# Patient Record
Sex: MALE | Race: WHITE | Employment: UNEMPLOYED | URBAN - METROPOLITAN AREA
[De-identification: names, ages, dates, MRNs, and addresses within clinical notes are randomized per-mention and may not be internally consistent; named-entity substitution may affect disease eponyms.]

---

## 2019-07-24 ENCOUNTER — APPOINTMENT (OUTPATIENT)
Dept: GENERAL RADIOLOGY | Facility: HOSPITAL | Age: 29
DRG: 273 | End: 2019-07-24
Attending: EMERGENCY MEDICINE
Payer: MEDICAID

## 2019-07-24 ENCOUNTER — HOSPITAL ENCOUNTER (INPATIENT)
Facility: HOSPITAL | Age: 29
LOS: 9 days | Discharge: HOME HEALTH | DRG: 273 | End: 2019-08-02
Attending: EMERGENCY MEDICINE | Admitting: HOSPITALIST
Payer: MEDICAID

## 2019-07-24 DIAGNOSIS — N17.9 AKI (ACUTE KIDNEY INJURY) (HCC): ICD-10-CM

## 2019-07-24 DIAGNOSIS — J06.9 UPPER RESPIRATORY TRACT INFECTION, UNSPECIFIED TYPE: ICD-10-CM

## 2019-07-24 DIAGNOSIS — B34.8 PARAINFLUENZA: ICD-10-CM

## 2019-07-24 DIAGNOSIS — R79.89 ELEVATED LFTS: ICD-10-CM

## 2019-07-24 DIAGNOSIS — R77.8 ELEVATED TROPONIN: ICD-10-CM

## 2019-07-24 DIAGNOSIS — I42.9 CARDIOMYOPATHY, UNSPECIFIED TYPE (HCC): Primary | ICD-10-CM

## 2019-07-24 DIAGNOSIS — R73.9 HYPERGLYCEMIA: ICD-10-CM

## 2019-07-24 DIAGNOSIS — I42.8 OTHER CARDIOMYOPATHY (HCC): ICD-10-CM

## 2019-07-24 DIAGNOSIS — G47.33 OSA (OBSTRUCTIVE SLEEP APNEA): ICD-10-CM

## 2019-07-24 DIAGNOSIS — K56.7 ILEUS (HCC): ICD-10-CM

## 2019-07-24 DIAGNOSIS — I48.91 ATRIAL FIBRILLATION WITH RAPID VENTRICULAR RESPONSE (HCC): ICD-10-CM

## 2019-07-24 LAB
ADENOVIRUS PCR:: NEGATIVE
ALBUMIN SERPL-MCNC: 4.3 G/DL (ref 3.4–5)
ALBUMIN/GLOB SERPL: 1.1 {RATIO} (ref 1–2)
ALLENS TEST: POSITIVE
ALP LIVER SERPL-CCNC: 42 U/L (ref 45–117)
ALT SERPL-CCNC: 32 U/L (ref 16–61)
ANION GAP SERPL CALC-SCNC: 10 MMOL/L (ref 0–18)
ANION GAP SERPL CALC-SCNC: 8 MMOL/L (ref 0–18)
ARTERIAL BLD GAS O2 SATURATION: 95 % (ref 92–100)
ARTERIAL BLOOD GAS BASE EXCESS: 1.2 MMOL/L (ref ?–2)
ARTERIAL BLOOD GAS HCO3: 25.5 MEQ/L (ref 22–26)
ARTERIAL BLOOD GAS PCO2: 40 MM HG (ref 35–45)
ARTERIAL BLOOD GAS PH: 7.43 (ref 7.35–7.45)
ARTERIAL BLOOD GAS PO2: 76 MM HG (ref 80–105)
AST SERPL-CCNC: 35 U/L (ref 15–37)
ATRIAL RATE: 75 BPM
B PERT DNA SPEC QL NAA+PROBE: NEGATIVE
BASOPHILS # BLD AUTO: 0.04 X10(3) UL (ref 0–0.2)
BASOPHILS NFR BLD AUTO: 0.4 %
BILIRUB SERPL-MCNC: 0.5 MG/DL (ref 0.1–2)
BUN BLD-MCNC: 13 MG/DL (ref 7–18)
BUN BLD-MCNC: 13 MG/DL (ref 7–18)
BUN/CREAT SERPL: 12 (ref 10–20)
BUN/CREAT SERPL: 14.9 (ref 10–20)
C PNEUM DNA SPEC QL NAA+PROBE: NEGATIVE
CALCIUM BLD-MCNC: 9 MG/DL (ref 8.5–10.1)
CALCIUM BLD-MCNC: 9 MG/DL (ref 8.5–10.1)
CALCULATED O2 SATURATION: 96 % (ref 92–100)
CARBOXYHEMOGLOBIN: 1 % SAT (ref 0–3)
CHLORIDE SERPL-SCNC: 103 MMOL/L (ref 98–112)
CHLORIDE SERPL-SCNC: 106 MMOL/L (ref 98–112)
CO2 SERPL-SCNC: 22 MMOL/L (ref 21–32)
CO2 SERPL-SCNC: 25 MMOL/L (ref 21–32)
CORONAVIRUS 229E PCR:: NEGATIVE
CORONAVIRUS HKU1 PCR:: NEGATIVE
CORONAVIRUS NL63 PCR:: NEGATIVE
CORONAVIRUS OC43 PCR:: NEGATIVE
CREAT BLD-MCNC: 0.87 MG/DL (ref 0.7–1.3)
CREAT BLD-MCNC: 1.08 MG/DL (ref 0.7–1.3)
DEPRECATED RDW RBC AUTO: 39 FL (ref 35.1–46.3)
DIGOXIN SERPL-MCNC: 0.55 NG/ML (ref 0.8–2)
EOSINOPHIL # BLD AUTO: 0.01 X10(3) UL (ref 0–0.7)
EOSINOPHIL NFR BLD AUTO: 0.1 %
ERYTHROCYTE [DISTWIDTH] IN BLOOD BY AUTOMATED COUNT: 11.9 % (ref 11–15)
FLUAV RNA SPEC QL NAA+PROBE: NEGATIVE
FLUBV RNA SPEC QL NAA+PROBE: NEGATIVE
GLOBULIN PLAS-MCNC: 3.9 G/DL (ref 2.8–4.4)
GLUCOSE BLD-MCNC: 122 MG/DL (ref 70–99)
GLUCOSE BLD-MCNC: 162 MG/DL (ref 70–99)
HCT VFR BLD AUTO: 46.2 % (ref 39–53)
HGB BLD-MCNC: 15.7 G/DL (ref 13–17.5)
IMM GRANULOCYTES # BLD AUTO: 0.05 X10(3) UL (ref 0–1)
IMM GRANULOCYTES NFR BLD: 0.5 %
L/M: 1 L/MIN
LACTATE SERPL-SCNC: 2.2 MMOL/L (ref 0.4–2)
LACTATE SERPL-SCNC: 2.2 MMOL/L (ref 0.4–2)
LACTATE SERPL-SCNC: 2.4 MMOL/L (ref 0.4–2)
LYMPHOCYTES # BLD AUTO: 1.19 X10(3) UL (ref 1–4)
LYMPHOCYTES NFR BLD AUTO: 11.3 %
M PROTEIN MFR SERPL ELPH: 8.2 G/DL (ref 6.4–8.2)
MCH RBC QN AUTO: 30.4 PG (ref 26–34)
MCHC RBC AUTO-ENTMCNC: 34 G/DL (ref 31–37)
MCV RBC AUTO: 89.4 FL (ref 80–100)
METAPNEUMOVIRUS PCR:: NEGATIVE
METHEMOGLOBIN: 0.5 % SAT (ref 0.4–1.5)
MONOCYTES # BLD AUTO: 1.52 X10(3) UL (ref 0.1–1)
MONOCYTES NFR BLD AUTO: 14.4 %
MYCOPLASMA PNEUMONIA PCR:: NEGATIVE
NEUTROPHILS # BLD AUTO: 7.73 X10 (3) UL (ref 1.5–7.7)
NEUTROPHILS # BLD AUTO: 7.73 X10(3) UL (ref 1.5–7.7)
NEUTROPHILS NFR BLD AUTO: 73.3 %
OSMOLALITY SERPL CALC.SUM OF ELEC: 286 MOSM/KG (ref 275–295)
OSMOLALITY SERPL CALC.SUM OF ELEC: 287 MOSM/KG (ref 275–295)
P/F RATIO: 361.3 MMHG
PARAINFLUENZA 1 PCR:: NEGATIVE
PARAINFLUENZA 2 PCR:: NEGATIVE
PARAINFLUENZA 3 PCR:: POSITIVE
PARAINFLUENZA 4 PCR:: NEGATIVE
PATIENT TEMPERATURE: 98.6 F
PLATELET # BLD AUTO: 240 10(3)UL (ref 150–450)
POTASSIUM SERPL-SCNC: 4.1 MMOL/L (ref 3.5–5.1)
POTASSIUM SERPL-SCNC: 4.5 MMOL/L (ref 3.5–5.1)
PROCALCITONIN SERPL-MCNC: 0.07 NG/ML
Q-T INTERVAL: 290 MS
QRS DURATION: 72 MS
QTC CALCULATION (BEZET): 446 MS
R AXIS: 182 DEGREES
RBC # BLD AUTO: 5.17 X10(6)UL (ref 4.3–5.7)
RHINOVIRUS/ENTERO PCR:: NEGATIVE
RSV RNA SPEC QL NAA+PROBE: NEGATIVE
SODIUM SERPL-SCNC: 136 MMOL/L (ref 136–145)
SODIUM SERPL-SCNC: 138 MMOL/L (ref 136–145)
T AXIS: -28 DEGREES
TOTAL HEMOGLOBIN: 15.1 G/DL (ref 13.2–17.3)
TROPONIN I SERPL-MCNC: 1.56 NG/ML (ref ?–0.04)
TROPONIN I SERPL-MCNC: 1.74 NG/ML (ref ?–0.04)
VENTRICULAR RATE: 142 BPM
WBC # BLD AUTO: 10.5 X10(3) UL (ref 4–11)

## 2019-07-24 PROCEDURE — 99223 1ST HOSP IP/OBS HIGH 75: CPT | Performed by: HOSPITALIST

## 2019-07-24 PROCEDURE — 71045 X-RAY EXAM CHEST 1 VIEW: CPT | Performed by: EMERGENCY MEDICINE

## 2019-07-24 RX ORDER — IPRATROPIUM BROMIDE AND ALBUTEROL SULFATE 2.5; .5 MG/3ML; MG/3ML
3 SOLUTION RESPIRATORY (INHALATION)
Status: DISCONTINUED | OUTPATIENT
Start: 2019-07-24 | End: 2019-07-25

## 2019-07-24 RX ORDER — METOPROLOL SUCCINATE 25 MG/1
75 TABLET, EXTENDED RELEASE ORAL DAILY
Status: DISCONTINUED | OUTPATIENT
Start: 2019-07-24 | End: 2019-07-28

## 2019-07-24 RX ORDER — METOPROLOL SUCCINATE 25 MG/1
25 TABLET, EXTENDED RELEASE ORAL DAILY
Refills: 2 | Status: ON HOLD | COMMUNITY
Start: 2019-06-29 | End: 2019-08-02

## 2019-07-24 RX ORDER — THIAMINE MONONITRATE (VIT B1) 100 MG
100 TABLET ORAL DAILY
Status: ON HOLD | COMMUNITY
End: 2019-12-16

## 2019-07-24 RX ORDER — BACLOFEN 10 MG/1
20 TABLET ORAL NIGHTLY
Refills: 2 | COMMUNITY
Start: 2019-06-28

## 2019-07-24 RX ORDER — DIGOXIN 0.25 MG/ML
500 INJECTION INTRAMUSCULAR; INTRAVENOUS ONCE
Status: COMPLETED | OUTPATIENT
Start: 2019-07-24 | End: 2019-07-24

## 2019-07-24 RX ORDER — DIGOXIN 125 MCG
250 TABLET ORAL DAILY
Refills: 0 | Status: ON HOLD | COMMUNITY
Start: 2019-07-18 | End: 2020-03-12

## 2019-07-24 RX ORDER — LOSARTAN POTASSIUM 25 MG/1
25 TABLET ORAL DAILY
Status: DISCONTINUED | OUTPATIENT
Start: 2019-07-24 | End: 2019-07-30

## 2019-07-24 RX ORDER — LOSARTAN POTASSIUM 25 MG/1
25 TABLET ORAL DAILY
Refills: 10 | Status: ON HOLD | COMMUNITY
End: 2021-12-22

## 2019-07-24 RX ORDER — METOPROLOL SUCCINATE 50 MG/1
100 TABLET, EXTENDED RELEASE ORAL DAILY
Refills: 0 | COMMUNITY
End: 2021-12-23

## 2019-07-24 RX ORDER — GUAIFENESIN 600 MG
600 TABLET, EXTENDED RELEASE 12 HR ORAL 2 TIMES DAILY
Status: DISCONTINUED | OUTPATIENT
Start: 2019-07-24 | End: 2019-08-01

## 2019-07-24 RX ORDER — BACLOFEN 20 MG/1
20 TABLET ORAL NIGHTLY
Status: DISCONTINUED | OUTPATIENT
Start: 2019-07-24 | End: 2019-08-02

## 2019-07-24 RX ORDER — SPIRONOLACTONE 25 MG/1
12.5 TABLET ORAL DAILY
Status: DISCONTINUED | OUTPATIENT
Start: 2019-07-24 | End: 2019-08-02

## 2019-07-24 RX ORDER — DIGOXIN 125 MCG
0.12 TABLET ORAL DAILY
Status: DISCONTINUED | OUTPATIENT
Start: 2019-07-24 | End: 2019-08-02

## 2019-07-24 RX ORDER — SPIRONOLACTONE 25 MG/1
12.5 TABLET ORAL DAILY
Refills: 1 | COMMUNITY
Start: 2019-07-22

## 2019-07-24 RX ORDER — OMEPRAZOLE 20 MG/1
20 CAPSULE, DELAYED RELEASE ORAL
Status: ON HOLD | COMMUNITY
End: 2019-12-16

## 2019-07-24 RX ORDER — METOPROLOL SUCCINATE 50 MG/1
50 TABLET, EXTENDED RELEASE ORAL DAILY
Status: DISCONTINUED | OUTPATIENT
Start: 2019-07-24 | End: 2019-07-24

## 2019-07-24 RX ORDER — CYANOCOBALAMIN (VITAMIN B-12) 500 MCG
400 LOZENGE ORAL EVERY MORNING
Status: ON HOLD | COMMUNITY
End: 2019-12-16

## 2019-07-24 RX ORDER — SODIUM CHLORIDE 9 MG/ML
INJECTION, SOLUTION INTRAVENOUS CONTINUOUS
Status: ACTIVE | OUTPATIENT
Start: 2019-07-24 | End: 2019-07-24

## 2019-07-24 NOTE — CONSULTS
[facility]  Report of Consultation    Sonny Coello Patient Status:  Emergency    5/3/1990 MRN [id number]   Location [address] [facility] Attending Thierry Jimenez MD   Hosp Day # 0 PCP Angelic Llanos MD     Reason for Consult kg)      Telemetry: reviewed and in atrial fibrillation  General: Awake, alert and in no apparent distress. HEENT: MMM, oropharynx clear  Neck: No JVD  Cardiac: Tachy, irregular, S1, S2 normal  Lungs: Clear, no wheezing  Abdomen: Soft, non-tender.    Extre

## 2019-07-24 NOTE — H&P
[facility]  History and Physical     Latisha Navarrete Patient Status:  Inpatient    5/3/1990 MRN [id number]   [facility] 6NE-A Attending Gogo Blount MD   Hosp Day # 0 PCP Catalina Cadet MD     Chief Complaint: weakness    His Prior to Encounter:  Losartan Potassium 25 MG Oral Tab Take 25 mg by mouth daily. Disp:  Rfl: 10   Metoprolol Succinate ER 25 MG Oral Tablet 24 Hr Take 25 mg by mouth daily.  Disp:  Rfl: 2   Metoprolol Succinate ER 50 MG Oral Tablet 24 Hr Take 50 mg by mout Recent Labs   Lab 07/24/19  0825   *   BUN 13   CREATSERUM 1.08   GFRAA 107   GFRNAA 93   CA 9.0   ALB 4.3      K 4.5      CO2 25.0   ALKPHO 42*   AST 35   ALT 32   BILT 0.5   TP 8.2     No results for input(s): PTP, INR in the last

## 2019-07-24 NOTE — ED NOTES
Upon Rn assessment patients lips pale in color, rt jugular pulsating. Pt placed on cardiac monitor and pulse ox. Rn continuing to monitor pt. Pt Afib on the monitor. Dr. Shey Kapadia notified of vss.

## 2019-07-24 NOTE — ED INITIAL ASSESSMENT (HPI)
Pt aox4. Pt presents to ed from home accompanied by parents. Pt is wheelchair bound. Pt to ed for tachycardia, diaphoresis, fever x 2 days.

## 2019-07-24 NOTE — PLAN OF CARE
Problem: SAFETY ADULT - FALL  Goal: Free from fall injury  Description  INTERVENTIONS:  - Assess pt frequently for physical needs  - Identify cognitive and physical deficits and behaviors that affect risk of falls.   - Oakes fall precautions as indica

## 2019-07-24 NOTE — HISTORICAL OFFICE NOTE
Bernard Benavides  : 1990  ACCOUNT: [id number]  [phone]  PCP: Dr. Anjel Ahmadi    TODAY'S DATE: 2018  DICTATED BY: [Dr. Mary Au    CHIEF COMPLAINT: [Followup of .  Heart failure, systolic and Followup of Cardiomyopathy secondary to Never used tobacco. denies smoking. CAFFEINE: no caffeine. ALCOHOL: denies drinking. EXERCISE: minimal. DIET: no special diet. MARITAL STATUS: single. OCCUPATION: disabled.      ALLERGIES: No Known Allergies    MEDICATIONS: Selected prescriptions see below recommended that she sees the pulmonologist as soon as possible to reinstitute home oxygen and determine if Sandeep needs a new CPAP mask. I told her that dose labs are stable and he does not have any sign of an ongoing respiratory tract infection.  I have aske

## 2019-07-24 NOTE — CONSULTS
[facility]  Report of Consultation    Christiano Cam Patient Status:  Inpatient    5/3/1990 MRN [id number]   [facility] 6NE-A Attending Hesham Marshall MD   Hosp Day # 0 PCP Lito Joshi MD     Reason for Consultation:  Laith Rao Surgical History:   Procedure Laterality Date   • SPINAL FUSION       No family history on file. reports that he has never smoked.  He has never used smokeless tobacco.    Allergies:  No Known Allergies    Medications:      No current outpatient medicatio PBWGM@    Lab Data Review:  Recent Labs     07/24/19  0825   WBC 10.5   HGB 15.7   .0     Recent Labs     07/24/19  0825      K 4.5      CO2 25.0   BUN 13   CREATSERUM 1.08   CA 9.0   ALB 4.3       @MG@    No results found for: PT, INR Yaron  7/24/2019  12:43 PM

## 2019-07-24 NOTE — CONSULTS
INFECTIOUS DISEASE CONSULTATION    Rancho Ma Patient Status:  Inpatient    5/3/1990 MRN [id number]   [facility] 6NE-A Attending Lon Estes MD   Hosp Day # 0 PCP Sonia Huff 0.125 mg by mouth daily. Disp:  Rfl: 0   spironolactone 25 MG Oral Tab Take 12.5 mg by mouth daily. Disp:  Rfl: 1   baclofen 10 MG Oral Tab Take 20 mg by mouth nightly. Disp:  Rfl: 2   Vitamin E 400 units Oral Tab Take 400 Units by mouth every morning. Updated: 07/24/19 1341    Procalcitonin 0.07 ng/mL          Microbiologic Data:   No results found for this visit on 07/24/19.         Problem list reviewed:  Patient Active Problem List:     Hyperglycemia     Atrial fibrillation with rapid ventricular resp

## 2019-07-24 NOTE — ED PROVIDER NOTES
Patient Seen in: [facility] Emergency Department    History   Patient presents with:  Arrythmia/Palpitations (cardiovascular)    Stated Complaint:     HPI    This is a 67-year-old male complaining of rapid heart rate.   This patient has Isabel at above.    Physical Exam     ED Triage Vitals   BP 07/24/19 0830 (!) 88/61   Pulse 07/24/19 0830 (!) 168   Resp 07/24/19 0830 (!) 0   Temp 07/24/19 0835 98.4 °F (36.9 °C)   Temp src 07/24/19 0835 Temporal   SpO2 07/24/19 0830 96 %   O2 Device 07/24/19 0830 Positive (*)     All other components within normal limits   CBC W/ DIFFERENTIAL - Abnormal; Notable for the following components:    Neutrophil Absolute Prelim 7.73 (*)     Neutrophil Absolute 7.73 (*)     Monocyte Absolute 1.52 (*)     All other componen seen by cardiology emergency department will be admitted to CCU    Admission disposition: 7/24/2019  9:39 AM                 Disposition and Plan     Clinical Impression:  Atrial fibrillation with rapid ventricular response ([address]  (primary encounter diagno

## 2019-07-25 ENCOUNTER — APPOINTMENT (OUTPATIENT)
Dept: GENERAL RADIOLOGY | Facility: HOSPITAL | Age: 29
DRG: 273 | End: 2019-07-25
Attending: INTERNAL MEDICINE
Payer: MEDICAID

## 2019-07-25 LAB
ANION GAP SERPL CALC-SCNC: 15 MMOL/L (ref 0–18)
ANION GAP SERPL CALC-SCNC: 17 MMOL/L (ref 0–18)
APTT PPP: 29.8 SECONDS (ref 25.4–36.1)
APTT PPP: 92.4 SECONDS (ref 25.4–36.1)
ATRIAL RATE: 312 BPM
ATRIAL RATE: 342 BPM
ATRIAL RATE: 342 BPM
BILIRUB UR QL STRIP.AUTO: NEGATIVE
BUN BLD-MCNC: 19 MG/DL (ref 7–18)
BUN BLD-MCNC: 21 MG/DL (ref 7–18)
BUN/CREAT SERPL: 11.2 (ref 10–20)
BUN/CREAT SERPL: 11.4 (ref 10–20)
CALCIUM BLD-MCNC: 8.2 MG/DL (ref 8.5–10.1)
CALCIUM BLD-MCNC: 8.5 MG/DL (ref 8.5–10.1)
CHLORIDE SERPL-SCNC: 100 MMOL/L (ref 98–112)
CHLORIDE SERPL-SCNC: 99 MMOL/L (ref 98–112)
CO2 SERPL-SCNC: 19 MMOL/L (ref 21–32)
CO2 SERPL-SCNC: 21 MMOL/L (ref 21–32)
COLOR UR AUTO: YELLOW
CREAT BLD-MCNC: 1.7 MG/DL (ref 0.7–1.3)
CREAT BLD-MCNC: 1.85 MG/DL (ref 0.7–1.3)
DEPRECATED RDW RBC AUTO: 41.1 FL (ref 35.1–46.3)
ERYTHROCYTE [DISTWIDTH] IN BLOOD BY AUTOMATED COUNT: 12.2 % (ref 11–15)
GLUCOSE BLD-MCNC: 273 MG/DL (ref 70–99)
GLUCOSE BLD-MCNC: 369 MG/DL (ref 70–99)
GLUCOSE UR STRIP.AUTO-MCNC: >=500 MG/DL
HCT VFR BLD AUTO: 48.1 % (ref 39–53)
HGB BLD-MCNC: 15.9 G/DL (ref 13–17.5)
HYALINE CASTS #/AREA URNS AUTO: PRESENT /LPF
KETONES UR STRIP.AUTO-MCNC: 20 MG/DL
LEUKOCYTE ESTERASE UR QL STRIP.AUTO: NEGATIVE
MCH RBC QN AUTO: 30.5 PG (ref 26–34)
MCHC RBC AUTO-ENTMCNC: 33.1 G/DL (ref 31–37)
MCV RBC AUTO: 92.1 FL (ref 80–100)
NITRITE UR QL STRIP.AUTO: NEGATIVE
OSMOLALITY SERPL CALC.SUM OF ELEC: 295 MOSM/KG (ref 275–295)
OSMOLALITY SERPL CALC.SUM OF ELEC: 297 MOSM/KG (ref 275–295)
P AXIS: 122 DEGREES
P AXIS: 174 DEGREES
PH UR STRIP.AUTO: 5 [PH] (ref 4.5–8)
PLATELET # BLD AUTO: 259 10(3)UL (ref 150–450)
POTASSIUM SERPL-SCNC: 4 MMOL/L (ref 3.5–5.1)
POTASSIUM SERPL-SCNC: 4.7 MMOL/L (ref 3.5–5.1)
PROT UR STRIP.AUTO-MCNC: 30 MG/DL
Q-T INTERVAL: 224 MS
Q-T INTERVAL: 228 MS
Q-T INTERVAL: 278 MS
QRS DURATION: 72 MS
QRS DURATION: 76 MS
QRS DURATION: 76 MS
QTC CALCULATION (BEZET): 374 MS
QTC CALCULATION (BEZET): 377 MS
QTC CALCULATION (BEZET): 384 MS
R AXIS: 180 DEGREES
R AXIS: 192 DEGREES
R AXIS: 201 DEGREES
RBC # BLD AUTO: 5.22 X10(6)UL (ref 4.3–5.7)
RBC UR QL AUTO: NEGATIVE
SODIUM SERPL-SCNC: 135 MMOL/L (ref 136–145)
SODIUM SERPL-SCNC: 136 MMOL/L (ref 136–145)
SP GR UR STRIP.AUTO: 1.03 (ref 1–1.03)
T AXIS: -14 DEGREES
T AXIS: -6 DEGREES
T AXIS: 45 DEGREES
UROBILINOGEN UR STRIP.AUTO-MCNC: <2 MG/DL
VENTRICULAR RATE: 109 BPM
VENTRICULAR RATE: 171 BPM
VENTRICULAR RATE: 171 BPM
WBC # BLD AUTO: 15.4 X10(3) UL (ref 4–11)

## 2019-07-25 PROCEDURE — 05H633Z INSERTION OF INFUSION DEVICE INTO LEFT SUBCLAVIAN VEIN, PERCUTANEOUS APPROACH: ICD-10-PCS | Performed by: HOSPITALIST

## 2019-07-25 PROCEDURE — 99232 SBSQ HOSP IP/OBS MODERATE 35: CPT | Performed by: HOSPITALIST

## 2019-07-25 PROCEDURE — 71045 X-RAY EXAM CHEST 1 VIEW: CPT | Performed by: INTERNAL MEDICINE

## 2019-07-25 RX ORDER — HEPARIN SODIUM AND DEXTROSE 10000; 5 [USP'U]/100ML; G/100ML
INJECTION INTRAVENOUS CONTINUOUS
Status: DISCONTINUED | OUTPATIENT
Start: 2019-07-25 | End: 2019-07-29

## 2019-07-25 RX ORDER — HEPARIN SODIUM AND DEXTROSE 10000; 5 [USP'U]/100ML; G/100ML
12 INJECTION INTRAVENOUS ONCE
Status: DISCONTINUED | OUTPATIENT
Start: 2019-07-25 | End: 2019-07-29

## 2019-07-25 RX ORDER — HEPARIN SODIUM 5000 [USP'U]/ML
60 INJECTION INTRAVENOUS; SUBCUTANEOUS ONCE
Status: COMPLETED | OUTPATIENT
Start: 2019-07-25 | End: 2019-07-25

## 2019-07-25 RX ORDER — ONDANSETRON 2 MG/ML
INJECTION INTRAMUSCULAR; INTRAVENOUS
Status: COMPLETED
Start: 2019-07-25 | End: 2019-07-25

## 2019-07-25 RX ORDER — ACETAMINOPHEN 325 MG/1
650 TABLET ORAL EVERY 6 HOURS PRN
Status: DISCONTINUED | OUTPATIENT
Start: 2019-07-25 | End: 2019-08-02

## 2019-07-25 RX ORDER — ONDANSETRON 2 MG/ML
4 INJECTION INTRAMUSCULAR; INTRAVENOUS ONCE
Status: COMPLETED | OUTPATIENT
Start: 2019-07-25 | End: 2019-07-25

## 2019-07-25 RX ORDER — DIGOXIN 0.25 MG/ML
250 INJECTION INTRAMUSCULAR; INTRAVENOUS ONCE
Status: COMPLETED | OUTPATIENT
Start: 2019-07-25 | End: 2019-07-25

## 2019-07-25 RX ORDER — ONDANSETRON 2 MG/ML
4 INJECTION INTRAMUSCULAR; INTRAVENOUS EVERY 4 HOURS PRN
Status: DISCONTINUED | OUTPATIENT
Start: 2019-07-25 | End: 2019-08-02

## 2019-07-25 RX ORDER — SODIUM CHLORIDE 9 MG/ML
INJECTION, SOLUTION INTRAVENOUS CONTINUOUS
Status: DISCONTINUED | OUTPATIENT
Start: 2019-07-25 | End: 2019-07-28

## 2019-07-25 RX ORDER — ONDANSETRON 2 MG/ML
4 INJECTION INTRAMUSCULAR; INTRAVENOUS EVERY 6 HOURS PRN
Status: DISCONTINUED | OUTPATIENT
Start: 2019-07-25 | End: 2019-07-25

## 2019-07-25 RX ORDER — IPRATROPIUM BROMIDE AND ALBUTEROL SULFATE 2.5; .5 MG/3ML; MG/3ML
3 SOLUTION RESPIRATORY (INHALATION) EVERY 4 HOURS PRN
Status: DISCONTINUED | OUTPATIENT
Start: 2019-07-25 | End: 2019-08-02

## 2019-07-25 RX ORDER — SODIUM CHLORIDE 0.9 % (FLUSH) 0.9 %
10 SYRINGE (ML) INJECTION EVERY 12 HOURS
Status: DISCONTINUED | OUTPATIENT
Start: 2019-07-25 | End: 2019-08-02

## 2019-07-25 RX ORDER — DILTIAZEM HYDROCHLORIDE 5 MG/ML
10 INJECTION INTRAVENOUS ONCE
Status: COMPLETED | OUTPATIENT
Start: 2019-07-25 | End: 2019-07-25

## 2019-07-25 NOTE — RESPIRATORY THERAPY NOTE
Received patient on 1L O2. Duoneb given QID, CPT ordered for QID but held last night because mom was concerned about patient's HR and a-fib. Breath sounds are clear/diminished, patient has strong cough.  AVAPS was attempted last night R10/450/10-20/+6, with

## 2019-07-25 NOTE — OCCUPATIONAL THERAPY NOTE
OCCUPATIONAL THERAPY QUICK EVALUATION - INPATIENT    Room Number: 0196/2319-T  Evaluation Date: 7/25/2019     Type of Evaluation: Initial  Presenting Problem: weakness    Physician Order: IP Consult to Occupational Therapy  Reason for Therapy:  ADL/IADL Dy NEUROLOGICAL FINDINGS                   ACTIVITY TOLERANCE                         O2 SATURATIONS                ACTIVITIES OF DAILY LIVING ASSESSMENT  AM-PAC ‘6-Clicks’ Inpatient Daily Activity Short Form   How much help from another person does the p Complexity  Occupational Profile/Medical History  LOW - Brief history including review of medical or therapy records    Specific performance deficits impacting engagement in ADL/IADL  LOW  1 - 3 performance deficits    Client Assessment/Performance Deficit

## 2019-07-25 NOTE — PROGRESS NOTES
[facility]  Advanced Heart Failure Progress Note    David Lal Patient Status:  Inpatient    5/3/1990 MRN [id number]   [facility] 6NE-A Attending Estuardo Carter MD   Hosp Day # 1 PCP Carlos Sanders MD     Subjective:  Very le 650 mg 650 mg Oral Q6H PRN   ipratropium-albuterol (DUONEB) nebulizer solution 3 mL 3 mL Nebulization QID   guaiFENesin ER (MUCINEX) 12 hr tab 600 mg 600 mg Oral BID   guaiFENesin (ROBITUSSIN) 100mg/5ml LIQUID 100 mg 100 mg Oral Q4H PRN   baclofen (LIORESA

## 2019-07-25 NOTE — PHYSICAL THERAPY NOTE
Physical Therapy    Orders received for PT evaluation. Pt admitted w/ a fib w/ rvr and upper respiratory infection. 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 Pt's family uses a shahram lift for all transfers.   Pt does not require

## 2019-07-25 NOTE — PHYSICAL THERAPY NOTE
Physical Therapy    Order received for PT. Pt's heart rate currently running in the 150's and low blood pressure. Will re-attempt as schedule and medical stability allow.

## 2019-07-25 NOTE — RESPIRATORY THERAPY NOTE
Assessed patient to adjust settings for comfort after it was reported this morning he was not tolerating last night and refused. Upon arrival to room, patient was already on Trilogy due to increasing sleepiness.  While monitoring patient was noted to have

## 2019-07-25 NOTE — PLAN OF CARE
Assumed care of this patient at [address] [place] [address]. A&O to person, follows commands. Monitor initially showing a-fib/flutter in the 110-130's, on Cardizem at 5 mg/hr. Then HR noted to be frequently in the 160's, MD notified.   Titrated up Cardizem gtt as ordered unsuc

## 2019-07-25 NOTE — PROGRESS NOTES
[facility]  Progress Note     Essence Herminia Patient Status:  Inpatient    5/3/1990 MRN [id number]   [facility] 6NE-A Attending Lilo Coy MD   Hosp Day # 1 PCP Zara Sue MD     Chief Complaint: Afib   S:  Vomited x above. Low suspicion for sepsis   5. Possible URI- resp panel + parainfluenza virus  1. ID and Pulmonary consulted by the Cardiologist   6. 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 Per Cardiology   7. Friedreich's Ataxia- outpt f/u   8.  Slee

## 2019-07-25 NOTE — PAYOR COMM NOTE
--------------  ADMISSION REVIEW     Payor: Mark Mar #:  [id number]  Authorization Number: [id number]    Admit date: 7/24/19  Admit time: 5       Admitting Physician: Ho Malone DO  Attending • Congestive heart disease (HCC)    • Depression    • Friedreich ataxia (HCC)    • Hearing impairment    • Incontinence    • Muscle weakness    • Problems with swallowing    • Scoliosis    • Sleep apnea    • Visual impairment        Past Surgical History: All other components within normal limits   TROPONIN I - Abnormal; Notable for the following components:    Troponin 1.740 (*)     All other components within normal limits   LACTIC ACID, PLASMA - Abnormal; Notable for the following components:    Lactic Xr Chest Ap Portable  (cpt=71045)    Result Date: 7/24/2019  CONCLUSION:  Enlarged cardiac silhouette with minimal pulmonary vascular congestion.     Dictated by: Patrica Ball MD on 7/24/2019 at 9:15     Approved by: Patrica Ball MD  5/3/1990 MRN [id number]   [facility] 6NE-A Attending Charlie Lr MD   Hosp Day # 0 PCP Cici Srinivasan MD     Chief Complaint: weakness    History of Present Illness: Lorice Bamberger is a 30year old male presents with generalized wea Losartan Potassium 25 MG Oral Tab Take 25 mg by mouth daily. Disp:  Rfl: 10   Metoprolol Succinate ER 25 MG Oral Tablet 24 Hr Take 25 mg by mouth daily. Disp:  Rfl: 2   Metoprolol Succinate ER 50 MG Oral Tablet 24 Hr Take 50 mg by mouth daily.  To take gretta Lab 07/24/19  0825   *   BUN 13   CREATSERUM 1.08   GFRAA 107   GFRNAA 93   CA 9.0   ALB 4.3      K 4.5      CO2 25.0   ALKPHO 42*   AST 35   ALT 32   BILT 0.5   TP 8.2     No results for input(s): PTP, INR in the last 168 hours.   Recent Piter Pulido is a pleasant 30year old gentleman, a patient of my partner Dr. Mireille Diehl. He has a history of Friedreich's ataxia with cardiomyopathy and ejection fraction 25%.   His mom and dad have been sick with a viral illness and she thinks that he m Musculoskeletal: Decreased range of motion consistent with diagnosis of matrix ataxia  Neurologic: Normal affect, alert and in no apparent distress  Skin: Warm and dry.      Laboratories and Data:     Labs:         Lab Results   Component Value Date     WB INFECTIOUS DISEASE CONSULTATION           Paz Kay Patient Status:  Inpatient    5/3/1990 MRN [id number]   [facility] 6NE-A Attending Metoprolol Succinate ER 50 MG Oral Tablet 24 Hr Take 50 mg by mouth daily. To take along with the 25 mg for total dose of 75 mg Disp:  Rfl: 0   digoxin 0.125 MG Oral Tab Take 0.125 mg by mouth daily.  Disp:  Rfl: 0   spironolactone 25 MG Oral Tab Take 12.5 ALKPHO 42*   AST 35   ALT 32   BILT 0.5   TP 8.2                  Lab Results   Component Value Date     TROP 1.740 07/24/2019             Procalcitonin [[id number]] (Normal) Collected: 07/24/19 1117   Specimen: Blood Updated: 07/24/19 1341     Procalcitoni 3.  Would also start IV heparin particularly given reduced EF and now atrial arrhythmias.   4.  A ?ALONSO/DCCV may be reasonable once respiratory/infection issues resolve but this may also be very challenging given his underlying Friedreich's ataxia and underl •  acetaminophen (TYLENOL) tab 650 mg, 650 mg, Oral, Q6H PRN  •  dilTIAZem HCl (CARDIZEM) injection 10 mg, 10 mg, Intravenous, Once  •  Heparin Sodium (Porcine) 5000 UNIT/ML injection 3,800 Units, 60 Units/kg, Intravenous, Once  •  heparin (PORCINE) 05990x    Labs:          BUN (mg/dL)   Date Value   07/24/2019 13   07/24/2019 13          Creatinine (mg/dL)   Date Value   07/24/2019 0.87   07/24/2019 1.08      No results found for: PT, INR        Roland Gallardo  7/25/2019  10:07 AM        7/25  Maty Dinero, Imaging: Imaging data reviewed in Epic. ASSESSMENT / PLAN: 1. Afib RVR  1. Cardizem, Dig  2. Cardio consulted  3. ECHO   4. Defer anticoagulation with Cardiology   2. Elevated troponin- type II MI from above.  Do not suspect ACS  3.  Mild hypotension lik Abdomen: Soft, nontender, nondistended. Positive bowel sounds. Musculoskeletal: joints: no swelling   Integument: No lesions. No erythema. No open wounds. Labs:              Recent Labs   Lab 07/24/19  0825 07/25/19  1019   RBC 5.17 5.22   HGB 15.7 15. 9 Date Action Dose Route User    7/25/2019 0446 New Bag 1 mg/min Intravenous Bill Cormier RN      amiodarone HCl in Dextrose (CORDARONE) 360 MG/200ML premix infusion     Date Action Dose Route User    7/25/2019 1047 New Bag 0.5 mg/min Intravenous Vivienne Rachid 7/24/2019 2133 Given 3 mL Nebulization Lin Goodwin RCP    7/24/2019 1730 Given 3 mL Nebulization Jose Mejia, DARLENE      Normal Saline Flush 0.9 % injection 10 mL     Date Action Dose Route User    7/25/2019 1230 Given 10 mL Intravenous JOHN Carbajal

## 2019-07-25 NOTE — CONSULTS
[facility]  Report of Consultation    Jyoti Kapadia Patient Status:  Inpatient    5/3/1990 MRN [id number]   [facility] 6NE-A Attending Dacia Tolbert MD   Hosp Day # 1 PCP MD Katelin Weston Friedreich ataxia (HCC)    • Hearing impairment    • Incontinence    • Muscle weakness    • Problems with swallowing    • Scoliosis    • Sleep apnea    • Visual impairment      Past Surgical History:   Procedure Laterality Date   • SPINAL FUSION       Hist temperature 97.4 °F (36.3 °C), temperature source Temporal, resp. rate 17, height 172.7 cm (5' 8\"), weight 138 lb 3.7 oz (62.7 kg), SpO2 91 %.   Temp (24hrs), Av.3 °F (36.8 °C), Min:97.4 °F (36.3 °C), Max:99.4 °F (37.4 °C)    Wt Readings from Last 3 En

## 2019-07-25 NOTE — PROGRESS NOTES
[facility]                INFECTIOUS DISEASE PROGRESS NOTE    Ginny Maradiaga Patient Status:  Inpatient    5/3/1990 MRN [id number]   [facility] 6NE-A Attending Abel Guerrero MD   Hosp Day # 1 PCP Devon Romero MD       Sub type     Elevated troponin     Cardiomyopathy (HCC)     TIA (obstructive sleep apnea)     Parainfluenza      ASSESSMENT/PLAN:  1. Parainfluenza, URI symptoms, cxr negative  PCT negative, off abx    2.  Afib/RVR per cards      MD Magdi Coyro In

## 2019-07-26 ENCOUNTER — APPOINTMENT (OUTPATIENT)
Dept: GENERAL RADIOLOGY | Facility: HOSPITAL | Age: 29
DRG: 273 | End: 2019-07-26
Attending: INTERNAL MEDICINE
Payer: MEDICAID

## 2019-07-26 LAB
ALBUMIN SERPL-MCNC: 3.5 G/DL (ref 3.4–5)
ALBUMIN/GLOB SERPL: 1 {RATIO} (ref 1–2)
ALLENS TEST: POSITIVE
ALP LIVER SERPL-CCNC: 36 U/L (ref 45–117)
ALT SERPL-CCNC: 311 U/L (ref 16–61)
ANION GAP SERPL CALC-SCNC: 10 MMOL/L (ref 0–18)
APTT PPP: 50.4 SECONDS (ref 25.4–36.1)
ARTERIAL BLD GAS O2 SATURATION: 96 % (ref 92–100)
ARTERIAL BLOOD GAS BASE EXCESS: -0.4 MMOL/L (ref ?–2)
ARTERIAL BLOOD GAS HCO3: 24 MEQ/L (ref 22–26)
ARTERIAL BLOOD GAS PCO2: 39 MM HG (ref 35–45)
ARTERIAL BLOOD GAS PH: 7.41 (ref 7.35–7.45)
ARTERIAL BLOOD GAS PO2: 98 MM HG (ref 80–105)
AST SERPL-CCNC: 283 U/L (ref 15–37)
BASOPHILS # BLD AUTO: 0.03 X10(3) UL (ref 0–0.2)
BASOPHILS NFR BLD AUTO: 0.2 %
BILIRUB SERPL-MCNC: 0.4 MG/DL (ref 0.1–2)
BILIRUB UR QL STRIP.AUTO: NEGATIVE
BUN BLD-MCNC: 22 MG/DL (ref 7–18)
BUN/CREAT SERPL: 12.8 (ref 10–20)
CALCIUM BLD-MCNC: 8.5 MG/DL (ref 8.5–10.1)
CALCULATED O2 SATURATION: 98 % (ref 92–100)
CARBOXYHEMOGLOBIN: 0.9 % SAT (ref 0–3)
CHLORIDE SERPL-SCNC: 101 MMOL/L (ref 98–112)
CO2 SERPL-SCNC: 25 MMOL/L (ref 21–32)
COLOR UR AUTO: YELLOW
CREAT BLD-MCNC: 1.72 MG/DL (ref 0.7–1.3)
DEPRECATED RDW RBC AUTO: 40.4 FL (ref 35.1–46.3)
EOSINOPHIL # BLD AUTO: 0 X10(3) UL (ref 0–0.7)
EOSINOPHIL NFR BLD AUTO: 0 %
ERYTHROCYTE [DISTWIDTH] IN BLOOD BY AUTOMATED COUNT: 12.2 % (ref 11–15)
EST. AVERAGE GLUCOSE BLD GHB EST-MCNC: 128 MG/DL (ref 68–126)
GLOBULIN PLAS-MCNC: 3.6 G/DL (ref 2.8–4.4)
GLUCOSE BLD-MCNC: 158 MG/DL (ref 70–99)
GLUCOSE BLD-MCNC: 179 MG/DL (ref 70–99)
GLUCOSE BLD-MCNC: 179 MG/DL (ref 70–99)
GLUCOSE BLD-MCNC: 199 MG/DL (ref 70–99)
GLUCOSE UR STRIP.AUTO-MCNC: 50 MG/DL
HBA1C MFR BLD HPLC: 6.1 % (ref ?–5.7)
HCT VFR BLD AUTO: 42.1 % (ref 39–53)
HGB BLD-MCNC: 14.1 G/DL (ref 13–17.5)
IMM GRANULOCYTES # BLD AUTO: 0.1 X10(3) UL (ref 0–1)
IMM GRANULOCYTES NFR BLD: 0.6 %
KETONES UR STRIP.AUTO-MCNC: 20 MG/DL
L/M: 2 L/MIN
LEUKOCYTE ESTERASE UR QL STRIP.AUTO: NEGATIVE
LYMPHOCYTES # BLD AUTO: 1.86 X10(3) UL (ref 1–4)
LYMPHOCYTES NFR BLD AUTO: 11 %
M PROTEIN MFR SERPL ELPH: 7.1 G/DL (ref 6.4–8.2)
MCH RBC QN AUTO: 30.6 PG (ref 26–34)
MCHC RBC AUTO-ENTMCNC: 33.5 G/DL (ref 31–37)
MCV RBC AUTO: 91.3 FL (ref 80–100)
METHEMOGLOBIN: 0.5 % SAT (ref 0.4–1.5)
MONOCYTES # BLD AUTO: 2.38 X10(3) UL (ref 0.1–1)
MONOCYTES NFR BLD AUTO: 14.1 %
NEUTROPHILS # BLD AUTO: 12.55 X10 (3) UL (ref 1.5–7.7)
NEUTROPHILS # BLD AUTO: 12.55 X10(3) UL (ref 1.5–7.7)
NEUTROPHILS NFR BLD AUTO: 74.1 %
NITRITE UR QL STRIP.AUTO: NEGATIVE
OSMOLALITY SERPL CALC.SUM OF ELEC: 291 MOSM/KG (ref 275–295)
P/F RATIO: 468 MMHG
PATIENT TEMPERATURE: 98.6 F
PH UR STRIP.AUTO: 6 [PH] (ref 4.5–8)
PLATELET # BLD AUTO: 243 10(3)UL (ref 150–450)
POTASSIUM SERPL-SCNC: 4.1 MMOL/L (ref 3.5–5.1)
PROCALCITONIN SERPL-MCNC: 0.36 NG/ML
PROT UR STRIP.AUTO-MCNC: 100 MG/DL
RBC # BLD AUTO: 4.61 X10(6)UL (ref 4.3–5.7)
RBC UR QL AUTO: NEGATIVE
SODIUM SERPL-SCNC: 136 MMOL/L (ref 136–145)
SP GR UR STRIP.AUTO: 1.04 (ref 1–1.03)
TOTAL HEMOGLOBIN: 14.2 G/DL (ref 13.2–17.3)
UROBILINOGEN UR STRIP.AUTO-MCNC: <2 MG/DL
WBC # BLD AUTO: 16.9 X10(3) UL (ref 4–11)

## 2019-07-26 PROCEDURE — 99233 SBSQ HOSP IP/OBS HIGH 50: CPT | Performed by: HOSPITALIST

## 2019-07-26 PROCEDURE — 74018 RADEX ABDOMEN 1 VIEW: CPT | Performed by: INTERNAL MEDICINE

## 2019-07-26 PROCEDURE — 71045 X-RAY EXAM CHEST 1 VIEW: CPT | Performed by: INTERNAL MEDICINE

## 2019-07-26 RX ORDER — DILTIAZEM HYDROCHLORIDE 5 MG/ML
10 INJECTION INTRAVENOUS ONCE
Status: DISCONTINUED | OUTPATIENT
Start: 2019-07-26 | End: 2019-07-29

## 2019-07-26 RX ORDER — DIGOXIN 0.25 MG/ML
250 INJECTION INTRAMUSCULAR; INTRAVENOUS ONCE
Status: COMPLETED | OUTPATIENT
Start: 2019-07-26 | End: 2019-07-26

## 2019-07-26 RX ORDER — METOCLOPRAMIDE HYDROCHLORIDE 5 MG/ML
10 INJECTION INTRAMUSCULAR; INTRAVENOUS EVERY 6 HOURS PRN
Status: DISCONTINUED | OUTPATIENT
Start: 2019-07-26 | End: 2019-08-02

## 2019-07-26 NOTE — PLAN OF CARE
Assumed pt care this morning. Alert, awake. Speech minimal, almost incomprehensible, able to nod appropriately. Denies sob/dyspnea.  Tele with ST with PACs this morning, transitioned to Rapid Aflutter later this morning, Dr. Sowmya Toth on unit when transiti

## 2019-07-26 NOTE — PLAN OF CARE
Assumed care of this patient at 1. Oriented to person, follows commands. Dry heaves, retching and coughing out phlegm overnight. No vomiting until this morning, small amount of thick white secretions. Monitor showed a-fib, ST w/ PACs at times.   Nataly

## 2019-07-26 NOTE — PROGRESS NOTES
[facility]  Progress Note     Karle Means Patient Status:  Inpatient    5/3/1990 MRN [id number]   [facility] 6NE-A Attending Sarina Villarreal MD   Hosp Day # 2 PCP Raji White MD     Chief Complaint: Afib   S:  Vomited a 07/24/19  0825 07/24/19  1526   TROP 1.740* 1.560*        Imaging: Imaging data reviewed in Epic. ASSESSMENT / PLAN:   1. Afib RVR- now in sinus tachy  1. Amio, BB  2. Cardio/EP following  3. Heparin    2. Elevated troponin- type II MI from above.   Do not

## 2019-07-26 NOTE — PROGRESS NOTES
[facility]    Electrophysiology Follow Up Note      Maciel Gonzalez Patient Status:  Inpatient    5/3/1990 MRN [id number]   [facility] 6NE-A Attending Oneyda Dunaway MD   Hosp Day # 2 PCP Husam Garcia MD     Reas Nightly  •  digoxin (LANOXIN) tab 0.125 mg, 0.125 mg, Oral, Daily  •  Losartan Potassium (COZAAR) tab 25 mg, 25 mg, Oral, Daily  •  Metoprolol Succinate ER (Toprol XL) 24 hr tab 75 mg, 75 mg, Oral, Daily  •  spironolactone (ALDACTONE) tab 12.5 mg, 12.5 mg, stress he has experienced atrial flutter and atrial fibrillation with rapid ventricular rate. Rate control was not effective and he was ultimately started on amiodarone.  He is now in sinus rhythm with PAC's.    1) cardiomyopathy  - he follows with Dr. Vonzella Homans

## 2019-07-26 NOTE — PROGRESS NOTES
[facility]  Progress Note    Purdysosvaldo Hope Patient Status:  Inpatient    5/3/1990 MRN [id number]   [facility] 6NE-A Attending Lilo Coy MD   Hosp Day # 2 PCP Zara Sue MD     Subjective:  Essence Hope is a(n) 30 yea --   --   --  7.1    < > = values in this interval not displayed.        Recent Labs   Lab 07/25/19  1019 07/25/19  1800 07/26/19  0800   PTT 29.8 92.4* 50.4*       Cultures: Blood 7/24 NGTD  RVP 7/24- +parainfluenza    Radiology:  Chest x-ray 7/26- no acut

## 2019-07-26 NOTE — PROGRESS NOTES
[facility]  Advanced Heart Failure Progress Note    Shana Shipmans Patient Status:  Inpatient    5/3/1990 MRN [id number]   [facility] 6NE-A Attending Kimberly Ingram MD   Hosp Day # 2 PCP Hayley Ayon MD     Subjective:   Has rec Medications:    Current Facility-Administered Medications:  Insulin Aspart Pen (NOVOLOG) 100 UNIT/ML flexpen 1-10 Units 1-10 Units Subcutaneous TID CC and HS   Metoclopramide HCl (REGLAN) injection 10 mg 10 mg Intravenous Q6H PRN   amiodarone HCl in

## 2019-07-26 NOTE — PAYOR COMM NOTE
--------------  CONTINUED STAY REVIEW    Payor: Mark Isabela #:  [id number]  Authorization Number: [id number]    Admit date: 7/24/19  Admit time: 5    Admitting Physician: DO Matteo New LIQUID 100 mg, 100 mg, Oral, Q4H PRN  •  baclofen (LIORESAL) tab 20 mg, 20 mg, Oral, Nightly  •  digoxin (LANOXIN) tab 0.125 mg, 0.125 mg, Oral, Daily  •  Losartan Potassium (COZAAR) tab 25 mg, 25 mg, Oral, Daily  •  Metoprolol Succinate ER (Toprol XL) 24 cardiomyopathy with LVEF 25% who is admitted with parainfluenza virus. In the setting of his viral illness and physiologic stress he has experienced atrial flutter and atrial fibrillation with rapid ventricular rate.  Rate control was not effective and he w Action Dose Route User    7/25/2019 2104 Given 20 mg Oral Roge Bernstein RN      Heparin Sodium (Porcine) 5000 UNIT/ML injection 3,800 Units     Date Action Dose Route User    7/25/2019 1259 Given 3800 Units Intravenous Timbo Martin RN      heparin ( Samuel Mathews RN    7/25/2019 1503 New Bag (none) Intravenous Barb Erickson RN      spironolactone (ALDACTONE) tab 12.5 mg     Date Action Dose Route User    7/25/2019 1118 Given 12.5 mg Oral Barb Erickson RN        Date/Time Temp Pulse Resp BP S

## 2019-07-26 NOTE — PROGRESS NOTES
07/25/19 2324   BiPAP   $ RT Standby Charge (per 15 min) 1   $ TIA Follow up charge Yes   Device Trilogy   Mode AVAPS   Interface Patient provided mask   AVAPS   Min IPAP 14   Max IPAP 20   EPAP Level 5   Set rate 16   Tidal volume 500   BiPAP/CPAP Sherri

## 2019-07-26 NOTE — PROGRESS NOTES
Cardiology addendum:    Called earlier today with AFlutter 140s despite Amiodarone drip and Digoxin. He has not received Toprol since admission with hypotension. Amiodarone 150 mg IV given then Cardizem drip as we can stop if he becomes hypotensive.   H

## 2019-07-26 NOTE — PROGRESS NOTES
[facility]                INFECTIOUS DISEASE PROGRESS NOTE    Javed Araujon Patient Status:  Inpatient    5/3/1990 MRN [id number]   [facility] 6NE-A Attending Tomas Sutton MD   Hosp Day # 2 PCP María Elena Holbrook MD       Sub reviewed:  Patient Active Problem List:     Hyperglycemia     Atrial fibrillation with rapid ventricular response (HCC)     Upper respiratory tract infection, unspecified type     Elevated troponin     Cardiomyopathy (HCC)     TAI (obstructive sleep apnea)

## 2019-07-27 LAB
ALBUMIN SERPL-MCNC: 3.3 G/DL (ref 3.4–5)
ALP LIVER SERPL-CCNC: 37 U/L (ref 45–117)
ALT SERPL-CCNC: 342 U/L (ref 16–61)
ANION GAP SERPL CALC-SCNC: 10 MMOL/L (ref 0–18)
APTT PPP: 28.7 SECONDS (ref 25.4–36.1)
APTT PPP: 33.2 SECONDS (ref 25.4–36.1)
APTT PPP: 42.8 SECONDS (ref 25.4–36.1)
AST SERPL-CCNC: 169 U/L (ref 15–37)
ATRIAL RATE: 300 BPM
BILIRUB DIRECT SERPL-MCNC: 0.2 MG/DL (ref 0–0.2)
BILIRUB SERPL-MCNC: 0.4 MG/DL (ref 0.1–2)
BUN BLD-MCNC: 21 MG/DL (ref 7–18)
BUN/CREAT SERPL: 16.2 (ref 10–20)
CALCIUM BLD-MCNC: 8.3 MG/DL (ref 8.5–10.1)
CHLORIDE SERPL-SCNC: 103 MMOL/L (ref 98–112)
CO2 SERPL-SCNC: 25 MMOL/L (ref 21–32)
CREAT BLD-MCNC: 1.3 MG/DL (ref 0.7–1.3)
DEPRECATED RDW RBC AUTO: 39.5 FL (ref 35.1–46.3)
ERYTHROCYTE [DISTWIDTH] IN BLOOD BY AUTOMATED COUNT: 12.1 % (ref 11–15)
GLUCOSE BLD-MCNC: 127 MG/DL (ref 70–99)
GLUCOSE BLD-MCNC: 129 MG/DL (ref 70–99)
GLUCOSE BLD-MCNC: 151 MG/DL (ref 70–99)
GLUCOSE BLD-MCNC: 154 MG/DL (ref 70–99)
GLUCOSE BLD-MCNC: 162 MG/DL (ref 70–99)
HCT VFR BLD AUTO: 40 % (ref 39–53)
HGB BLD-MCNC: 13.2 G/DL (ref 13–17.5)
M PROTEIN MFR SERPL ELPH: 6.2 G/DL (ref 6.4–8.2)
MCH RBC QN AUTO: 29.7 PG (ref 26–34)
MCHC RBC AUTO-ENTMCNC: 33 G/DL (ref 31–37)
MCV RBC AUTO: 89.9 FL (ref 80–100)
OSMOLALITY SERPL CALC.SUM OF ELEC: 293 MOSM/KG (ref 275–295)
PLATELET # BLD AUTO: 218 10(3)UL (ref 150–450)
POTASSIUM SERPL-SCNC: 3.6 MMOL/L (ref 3.5–5.1)
POTASSIUM SERPL-SCNC: 4.3 MMOL/L (ref 3.5–5.1)
Q-T INTERVAL: 354 MS
QRS DURATION: 72 MS
QTC CALCULATION (BEZET): 408 MS
R AXIS: 176 DEGREES
RBC # BLD AUTO: 4.45 X10(6)UL (ref 4.3–5.7)
SODIUM SERPL-SCNC: 138 MMOL/L (ref 136–145)
T AXIS: 24 DEGREES
VENTRICULAR RATE: 80 BPM
WBC # BLD AUTO: 17.2 X10(3) UL (ref 4–11)

## 2019-07-27 PROCEDURE — 99233 SBSQ HOSP IP/OBS HIGH 50: CPT | Performed by: HOSPITALIST

## 2019-07-27 RX ORDER — MAGNESIUM OXIDE 400 MG (241.3 MG MAGNESIUM) TABLET
1 TABLET NIGHTLY
Status: DISCONTINUED | OUTPATIENT
Start: 2019-07-27 | End: 2019-07-31

## 2019-07-27 RX ORDER — HEPARIN SODIUM 5000 [USP'U]/ML
30 INJECTION INTRAVENOUS; SUBCUTANEOUS ONCE
Status: COMPLETED | OUTPATIENT
Start: 2019-07-27 | End: 2019-07-27

## 2019-07-27 RX ORDER — FENOPROFEN CALCIUM 200 MG
CAPSULE ORAL 4 TIMES DAILY
Status: DISCONTINUED | OUTPATIENT
Start: 2019-07-27 | End: 2019-07-31

## 2019-07-27 RX ORDER — HEPARIN SODIUM 5000 [USP'U]/ML
INJECTION, SOLUTION INTRAVENOUS; SUBCUTANEOUS
Status: DISPENSED
Start: 2019-07-27 | End: 2019-07-28

## 2019-07-27 RX ORDER — POLYETHYLENE GLYCOL 3350 17 G/17G
17 POWDER, FOR SOLUTION ORAL
Status: DISCONTINUED | OUTPATIENT
Start: 2019-07-27 | End: 2019-08-02

## 2019-07-27 NOTE — PROGRESS NOTES
[facility]    Cardiology Progress Note    Essence Hope Patient Status:  Inpatient    5/3/1990 MRN [id number]   [facility] 6NE-A Attending Lilo Coy MD   Hosp Day # 3 PCP Zara Sue MD     Patient Active Problem List: Emesis/NG output  --  --  --    Emesis Occurrence 3 x 1 x --    Stool  --  --  --    Stool Occurrence 1 x 1 x --    Total Output 550 200 --       Net I/O     175 4952 --           Last 3 Weights  07/27/19 0400 : 144 lb 10 oz (65.6 kg)  07/26/19 0200 : 1 diltiazem 100mg/100ml in NaCl (CARDIZEM) 1 mg/mL premix/add-vantage 2.5-20 mg/hr Intravenous Continuous   dilTIAZem HCl (CARDIZEM) injection 10 mg 10 mg Intravenous Once   amiodarone HCl in Dextrose (CORDARONE) 360 MG/200ML premix infusion 0.5 mg/min Int

## 2019-07-27 NOTE — PROGRESS NOTES
[facility]  Progress Note    Jo Ann Burger Patient Status:  Inpatient    5/3/1990 MRN [id number]   [facility] 6NE-A Attending Kary Raygoza MD   Hosp Day # 3 PCP Lupe Jones MD     Subjective:  Jo Ann Burger is a(n) 30 yea 07/25/19  1800 07/26/19  0800 07/27/19  0409   PTT 92.4* 50.4* 28.7       Cultures: Blood 7/24 NGTD  RVP 7/24- +parainfluenza  Blood NGTD, PCT 0.36    Radiology:  Chest x-ray 7/26- no acute process  KUB 7/26- mild gaseous distention of small and large willie

## 2019-07-27 NOTE — PROGRESS NOTES
[facility]  Progress Note     Jing Layton Patient Status:  Inpatient    5/3/1990 MRN [id number]   [facility] 6NE-A Attending Massimo Salcedo MD   Hosp Day # 3 PCP Alisa Mak MD     Chief Complaint: Afib  S:  Feeling be 1.740* 1.560*        Imaging: Imaging data reviewed in Epic. ASSESSMENT / PLAN:   1. Aflutter RVR- rate better controlled   1. Amio, BB, dig, Cardizem  2. Cardio/EP following  3. Heparin    2. Elevated troponin- type II MI from above.   Do not suspect ACS

## 2019-07-27 NOTE — PLAN OF CARE
Received pt at 1930. Pt has baseline neurological disorder, but is alert and oriented. Patient transitioning between AVAPS with home mask and NC with 2 L/min to maintain comfort and oxygenation throughout shift.  Lungs sounds are clear in the L lung but dec

## 2019-07-27 NOTE — PROGRESS NOTES
07/27/19 0200   BiPAP   $ RT Standby Charge (per 15 min) 1   Device Trilogy   Mode AVAPS   Interface Patient provided mask   SIPAP   Resp 13   BiPAP/CPAP Monitored Parameters   Pulse 85   SpO2 99 %   Toleration Refused      Pt.  Refusing to wear Trilogy,

## 2019-07-27 NOTE — PLAN OF CARE
Received pt alert to person, incomprehensible words and nods head appropriately to questions. Pt remains in aflutter HR in 70s, 80s on amiodarone and cardizem gtts. BP systolic 45N, 097F. Per cardiology, pt weaned off cardizem gtt, HR controlled wnl.   Sunday Slates Evaluate effectiveness of vasoactive medications to optimize hemodynamic stability  - Monitor arterial and/or venous puncture sites for bleeding and/or hematoma  - Assess quality of pulses, skin color and temperature  - Assess for signs of decreased corona

## 2019-07-27 NOTE — PROGRESS NOTES
[facility]                INFECTIOUS DISEASE PROGRESS NOTE    Paz Kay Patient Status:  Inpatient    5/3/1990 MRN [id number]   [facility] 6NE-A Attending Dulce Hanson MD   Hosp Day # 3 PCP Chayito Mcghee MD       Sub Result No Growth 3 Days N/A       Problem list reviewed:  Patient Active Problem List:     Hyperglycemia     Atrial fibrillation with rapid ventricular response (HCC)     Upper respiratory tract infection, unspecified type     Elevated troponin     Cardiom

## 2019-07-28 LAB
ALBUMIN SERPL-MCNC: 3 G/DL (ref 3.4–5)
ALBUMIN/GLOB SERPL: 1 {RATIO} (ref 1–2)
ALP LIVER SERPL-CCNC: 34 U/L (ref 45–117)
ALT SERPL-CCNC: 253 U/L (ref 16–61)
ANION GAP SERPL CALC-SCNC: 7 MMOL/L (ref 0–18)
APTT PPP: 39.4 SECONDS (ref 25.4–36.1)
APTT PPP: 50.7 SECONDS (ref 25.4–36.1)
APTT PPP: 51.8 SECONDS (ref 25.4–36.1)
AST SERPL-CCNC: 86 U/L (ref 15–37)
BILIRUB SERPL-MCNC: 0.5 MG/DL (ref 0.1–2)
BUN BLD-MCNC: 17 MG/DL (ref 7–18)
BUN/CREAT SERPL: 15.5 (ref 10–20)
CALCIUM BLD-MCNC: 7.9 MG/DL (ref 8.5–10.1)
CHLORIDE SERPL-SCNC: 103 MMOL/L (ref 98–112)
CO2 SERPL-SCNC: 26 MMOL/L (ref 21–32)
CREAT BLD-MCNC: 1.1 MG/DL (ref 0.7–1.3)
DEPRECATED RDW RBC AUTO: 39.4 FL (ref 35.1–46.3)
ERYTHROCYTE [DISTWIDTH] IN BLOOD BY AUTOMATED COUNT: 12.3 % (ref 11–15)
GLOBULIN PLAS-MCNC: 3.1 G/DL (ref 2.8–4.4)
GLUCOSE BLD-MCNC: 108 MG/DL (ref 70–99)
GLUCOSE BLD-MCNC: 115 MG/DL (ref 70–99)
GLUCOSE BLD-MCNC: 118 MG/DL (ref 70–99)
GLUCOSE BLD-MCNC: 119 MG/DL (ref 70–99)
GLUCOSE BLD-MCNC: 130 MG/DL (ref 70–99)
HCT VFR BLD AUTO: 34.8 % (ref 39–53)
HGB BLD-MCNC: 12.3 G/DL (ref 13–17.5)
M PROTEIN MFR SERPL ELPH: 6.1 G/DL (ref 6.4–8.2)
MCH RBC QN AUTO: 32.3 PG (ref 26–34)
MCHC RBC AUTO-ENTMCNC: 35.3 G/DL (ref 31–37)
MCV RBC AUTO: 91.3 FL (ref 80–100)
OSMOLALITY SERPL CALC.SUM OF ELEC: 284 MOSM/KG (ref 275–295)
PLATELET # BLD AUTO: 210 10(3)UL (ref 150–450)
POTASSIUM SERPL-SCNC: 3.5 MMOL/L (ref 3.5–5.1)
RBC # BLD AUTO: 3.81 X10(6)UL (ref 4.3–5.7)
SODIUM SERPL-SCNC: 136 MMOL/L (ref 136–145)
WBC # BLD AUTO: 11.3 X10(3) UL (ref 4–11)

## 2019-07-28 PROCEDURE — 99233 SBSQ HOSP IP/OBS HIGH 50: CPT | Performed by: HOSPITALIST

## 2019-07-28 RX ORDER — METOPROLOL SUCCINATE 25 MG/1
25 TABLET, EXTENDED RELEASE ORAL
Status: DISCONTINUED | OUTPATIENT
Start: 2019-07-28 | End: 2019-07-29

## 2019-07-28 NOTE — PLAN OF CARE
A-flutter. Rate up to 140's but not sustained- HR came down to <110 within 1 minute. O2 at 1L NC  Voiding. Small BM. Bisacodyl pill given. Tolerating diet. Abdomen still distended. SCDs in place.    Problem: Patient/Family Goals  Goal: Patient/Family Richard

## 2019-07-28 NOTE — PROGRESS NOTES
[facility]  Progress Note     Karle Means Patient Status:  Inpatient    5/3/1990 MRN [id number]   [facility] 6NE-A Attending Sarina Villarreal MD   Hosp Day # 4 PCP Raji White MD     Chief Complaint: Afib  S:  Feeling be Epic.  ASSESSMENT / PLAN:   1. Aflutter RVR- rate better controlled   1. Amio now stopped  2. BB, dig  3. Cardio/EP following  4. Heparin    5. NPO midnight for EP eval and possible intervention   2. Elevated troponin- type II MI from above.   Do not suspec

## 2019-07-28 NOTE — PROGRESS NOTES
07/27/19 2307   BiPAP   $ RT Standby Charge (per 15 min) 1  (bipap check)   Device Trilogy   Mode AVAPS   Interface Patient provided mask   AVAPS   Min IPAP 14   Max IPAP 20   EPAP Level 5   Set rate 16   Tidal volume 500   BiPAP/CPAP Monitored Paramete

## 2019-07-28 NOTE — PLAN OF CARE
Problem: Patient/Family Goals  Goal: Patient/Family Long Term Goal  Description  Patient's Long Term Goal: go home    Interventions:  - continue medical management  - await clearance for d/c  - comply with medical plan  - See additional Care Plan goals f

## 2019-07-28 NOTE — PROGRESS NOTES
[facility]    Cardiology Progress Note    Claudia Yu Patient Status:  Inpatient    5/3/1990 MRN [id number]   [facility] 6NE-A Attending Gil Palafox MD   Hosp Day # 4 PCP Rekha Laureano MD     Patient Active Problem List: (mL) Amiodarone 489 378 --    Volume (mL) (0.9% NaCl infusion) 778 912 --    Total Intake 1384 2649 --       Output    Urine  550  200  --    Urine 550 200 --    Void Urine Occurrence 5 x -- --    Incontinent Urine Occurrence 2 x -- --    Emesis/NG output No Known Allergies    Medications:    Current Facility-Administered Medications:  potassium chloride 40 mEq in sodium chloride 0.9% 250 mL IVPB 40 mEq Intravenous Once   Metoprolol Succinate ER (Toprol XL) 24 hr tab 25 mg 25 mg Oral Daily Beta Blocker

## 2019-07-28 NOTE — PLAN OF CARE
Received pt at 1930. Pt alert and oriented. He has baseline neurological deficits related to progress neurological disorder, including: speech difficulty and muscular weakness. Pt in Atrial flutter, HR in the 80-100s, SBP in the 90s.  He is on an amiodarone

## 2019-07-28 NOTE — PROGRESS NOTES
[facility]  Progress Note    Lulu España Patient Status:  Inpatient    5/3/1990 MRN [id number]   [facility] 6NE-A Attending Ravindra Kitchen MD   Hosp Day # 4 PCP Ellis Mai MD     Subjective:  Lulu Patria is a(n) 30 yea +parainfluenza  Blood NGTD, PCT 0.36  Urine- negative    Radiology:  Chest x-ray 7/26- no acute process  KUB 7/26- mild gaseous distention of small and large bowel      Medications reviewed     Assessment and Plan:     ASSESSMENT  1.  Viral syndrome with pa

## 2019-07-28 NOTE — PROGRESS NOTES
[facility]                INFECTIOUS DISEASE PROGRESS NOTE    Harris Ku Patient Status:  Inpatient    5/3/1990 MRN [id number]   [facility] 6NE-A Attending Sebas Galvan MD   Hosp Day # 4 PCP Carl Tamayo MD       Sub URINE CULTURE, ROUTINE     Status: None    Collection Time: 07/26/19 11:25 AM   Result Value Ref Range    Urine Culture No Growth at 18-24 hrs.  N/A       Problem list reviewed:  Patient Active Problem List:     Hyperglycemia     Atrial fibrillation with ra

## 2019-07-29 ENCOUNTER — APPOINTMENT (OUTPATIENT)
Dept: INTERVENTIONAL RADIOLOGY/VASCULAR | Facility: HOSPITAL | Age: 29
DRG: 273 | End: 2019-07-29
Attending: INTERNAL MEDICINE
Payer: MEDICAID

## 2019-07-29 ENCOUNTER — APPOINTMENT (OUTPATIENT)
Dept: CV DIAGNOSTICS | Facility: HOSPITAL | Age: 29
DRG: 273 | End: 2019-07-29
Attending: INTERNAL MEDICINE
Payer: MEDICAID

## 2019-07-29 ENCOUNTER — ANESTHESIA EVENT (OUTPATIENT)
Dept: INTERVENTIONAL RADIOLOGY/VASCULAR | Facility: HOSPITAL | Age: 29
End: 2019-07-29

## 2019-07-29 ENCOUNTER — ANESTHESIA (OUTPATIENT)
Dept: INTERVENTIONAL RADIOLOGY/VASCULAR | Facility: HOSPITAL | Age: 29
End: 2019-07-29

## 2019-07-29 LAB
ALBUMIN SERPL-MCNC: 2.9 G/DL (ref 3.4–5)
ALBUMIN/GLOB SERPL: 0.9 {RATIO} (ref 1–2)
ALP LIVER SERPL-CCNC: 36 U/L (ref 45–117)
ALT SERPL-CCNC: 171 U/L (ref 16–61)
ANION GAP SERPL CALC-SCNC: 6 MMOL/L (ref 0–18)
APTT PPP: 61 SECONDS (ref 25.4–36.1)
AST SERPL-CCNC: 39 U/L (ref 15–37)
BILIRUB SERPL-MCNC: 0.6 MG/DL (ref 0.1–2)
BUN BLD-MCNC: 15 MG/DL (ref 7–18)
BUN/CREAT SERPL: 13.3 (ref 10–20)
CALCIUM BLD-MCNC: 7.8 MG/DL (ref 8.5–10.1)
CHLORIDE SERPL-SCNC: 105 MMOL/L (ref 98–112)
CO2 SERPL-SCNC: 27 MMOL/L (ref 21–32)
CREAT BLD-MCNC: 1.13 MG/DL (ref 0.7–1.3)
DEPRECATED RDW RBC AUTO: 41.1 FL (ref 35.1–46.3)
ERYTHROCYTE [DISTWIDTH] IN BLOOD BY AUTOMATED COUNT: 12.8 % (ref 11–15)
GLOBULIN PLAS-MCNC: 3.1 G/DL (ref 2.8–4.4)
GLUCOSE BLD-MCNC: 105 MG/DL (ref 70–99)
GLUCOSE BLD-MCNC: 107 MG/DL (ref 70–99)
GLUCOSE BLD-MCNC: 179 MG/DL (ref 70–99)
GLUCOSE BLD-MCNC: 90 MG/DL (ref 70–99)
GLUCOSE BLD-MCNC: 97 MG/DL (ref 70–99)
HCT VFR BLD AUTO: 32.4 % (ref 39–53)
HGB BLD-MCNC: 11.7 G/DL (ref 13–17.5)
M PROTEIN MFR SERPL ELPH: 6 G/DL (ref 6.4–8.2)
MCH RBC QN AUTO: 34.5 PG (ref 26–34)
MCHC RBC AUTO-ENTMCNC: 36.1 G/DL (ref 31–37)
MCV RBC AUTO: 95.6 FL (ref 80–100)
OSMOLALITY SERPL CALC.SUM OF ELEC: 287 MOSM/KG (ref 275–295)
PLATELET # BLD AUTO: 235 10(3)UL (ref 150–450)
POTASSIUM SERPL-SCNC: 3.7 MMOL/L (ref 3.5–5.1)
RBC # BLD AUTO: 3.39 X10(6)UL (ref 4.3–5.7)
SODIUM SERPL-SCNC: 138 MMOL/L (ref 136–145)
WBC # BLD AUTO: 8.7 X10(3) UL (ref 4–11)

## 2019-07-29 PROCEDURE — B246ZZ4 ULTRASONOGRAPHY OF RIGHT AND LEFT HEART, TRANSESOPHAGEAL: ICD-10-PCS | Performed by: INTERNAL MEDICINE

## 2019-07-29 PROCEDURE — 93320 DOPPLER ECHO COMPLETE: CPT | Performed by: INTERNAL MEDICINE

## 2019-07-29 PROCEDURE — 5A2204Z RESTORATION OF CARDIAC RHYTHM, SINGLE: ICD-10-PCS | Performed by: INTERNAL MEDICINE

## 2019-07-29 PROCEDURE — 02583ZZ DESTRUCTION OF CONDUCTION MECHANISM, PERCUTANEOUS APPROACH: ICD-10-PCS | Performed by: INTERNAL MEDICINE

## 2019-07-29 PROCEDURE — 99232 SBSQ HOSP IP/OBS MODERATE 35: CPT | Performed by: HOSPITALIST

## 2019-07-29 PROCEDURE — 93325 DOPPLER ECHO COLOR FLOW MAPG: CPT | Performed by: INTERNAL MEDICINE

## 2019-07-29 RX ORDER — DEXTROSE MONOHYDRATE 25 G/50ML
50 INJECTION, SOLUTION INTRAVENOUS
Status: DISCONTINUED | OUTPATIENT
Start: 2019-07-29 | End: 2019-07-29 | Stop reason: HOSPADM

## 2019-07-29 RX ORDER — HEPARIN SODIUM 5000 [USP'U]/ML
INJECTION, SOLUTION INTRAVENOUS; SUBCUTANEOUS
Status: COMPLETED
Start: 2019-07-29 | End: 2019-07-29

## 2019-07-29 RX ORDER — AMIODARONE HYDROCHLORIDE 50 MG/ML
INJECTION, SOLUTION INTRAVENOUS
Status: COMPLETED
Start: 2019-07-29 | End: 2019-07-29

## 2019-07-29 RX ORDER — METOPROLOL SUCCINATE 50 MG/1
50 TABLET, EXTENDED RELEASE ORAL
Status: DISCONTINUED | OUTPATIENT
Start: 2019-07-30 | End: 2019-07-30

## 2019-07-29 RX ORDER — ONDANSETRON 2 MG/ML
4 INJECTION INTRAMUSCULAR; INTRAVENOUS AS NEEDED
Status: DISCONTINUED | OUTPATIENT
Start: 2019-07-29 | End: 2019-07-29 | Stop reason: HOSPADM

## 2019-07-29 RX ORDER — ECHINACEA PURPUREA EXTRACT 125 MG
1 TABLET ORAL
Status: DISCONTINUED | OUTPATIENT
Start: 2019-07-29 | End: 2019-08-02

## 2019-07-29 RX ORDER — ALBUTEROL SULFATE 2.5 MG/3ML
2.5 SOLUTION RESPIRATORY (INHALATION) AS NEEDED
Status: DISCONTINUED | OUTPATIENT
Start: 2019-07-29 | End: 2019-07-29 | Stop reason: HOSPADM

## 2019-07-29 RX ORDER — LIDOCAINE HYDROCHLORIDE 10 MG/ML
INJECTION, SOLUTION EPIDURAL; INFILTRATION; INTRACAUDAL; PERINEURAL
Status: COMPLETED
Start: 2019-07-29 | End: 2019-07-29

## 2019-07-29 RX ORDER — DOFETILIDE 0.25 MG/1
250 CAPSULE ORAL EVERY 12 HOURS
Status: DISCONTINUED | OUTPATIENT
Start: 2019-07-29 | End: 2019-08-02

## 2019-07-29 RX ORDER — NALOXONE HYDROCHLORIDE 0.4 MG/ML
80 INJECTION, SOLUTION INTRAMUSCULAR; INTRAVENOUS; SUBCUTANEOUS AS NEEDED
Status: DISCONTINUED | OUTPATIENT
Start: 2019-07-29 | End: 2019-07-29 | Stop reason: HOSPADM

## 2019-07-29 RX ORDER — POTASSIUM CHLORIDE 1.5 G/1.77G
40 POWDER, FOR SOLUTION ORAL ONCE
Status: COMPLETED | OUTPATIENT
Start: 2019-07-29 | End: 2019-07-29

## 2019-07-29 RX ORDER — SODIUM CHLORIDE, SODIUM LACTATE, POTASSIUM CHLORIDE, CALCIUM CHLORIDE 600; 310; 30; 20 MG/100ML; MG/100ML; MG/100ML; MG/100ML
INJECTION, SOLUTION INTRAVENOUS CONTINUOUS
Status: DISCONTINUED | OUTPATIENT
Start: 2019-07-29 | End: 2019-08-02

## 2019-07-29 RX ORDER — HYDROMORPHONE HYDROCHLORIDE 1 MG/ML
0.4 INJECTION, SOLUTION INTRAMUSCULAR; INTRAVENOUS; SUBCUTANEOUS EVERY 5 MIN PRN
Status: DISCONTINUED | OUTPATIENT
Start: 2019-07-29 | End: 2019-07-29 | Stop reason: HOSPADM

## 2019-07-29 NOTE — PROCEDURES
Cardiology Transesophageal Echo Note    PRE-PROCEDURE DIAGNOSIS: persistent atrial fib with RVR. PROCEDURE: Transesophageal Echocardiogram.    SEDATION:   Sedation was supervised by anesthesia. Please refer to separate notes for details.     DESCRIPTION

## 2019-07-29 NOTE — ANESTHESIA POSTPROCEDURE EVALUATION
[address] [place] [address] Patient Status:  Inpatient   Age/Gender 30year old male MRN [id number]   Location [address] [facility] Attending Maty Dinero MD   [address] Day # 5 PCP Cici Srinivasan MD       Anesthesia Post-op Note

## 2019-07-29 NOTE — PROGRESS NOTES
[facility]    Electrophysiology Follow Up Note      Harris Ku Patient Status:  Inpatient    5/3/1990 MRN [id number]   [facility] 6NE-A Attending Sebas Galvan MD   Hosp Day # 5 PCP Carl Tamayo MD     Reas (ALDACTONE) tab 12.5 mg, 12.5 mg, Oral, Daily      Physical Exam:  Blood pressure (!) 88/71, pulse 101, temperature 99.1 °F (37.3 °C), temperature source Temporal, resp. rate 21, height 5' 8\" (1.727 m), weight 148 lb 2.4 oz (67.2 kg), SpO2 95 %.   Temp (24 despite rate control. I had a very long discussion with his mother regarding rhythm control options, which included DCCV, AAD (for him likely dofetilide) and EP study and ablation.  She is hesitant for sedation, however sedation would be required for ALONSO pr

## 2019-07-29 NOTE — PROGRESS NOTES
[facility]  Progress Note     Lorice Bamberger Patient Status:  Inpatient    5/3/1990 MRN [id number]   [facility] 6NE-A Attending Maty Dinero MD   Hosp Day # 5 PCP Cici Srinivasan MD     Chief Complaint: Afib  S:  Feeling be 1. Amio now stopped  2. BB, dig  3. Cardio/EP following  4. Heparin    5. NPO midnight for EP eval and possible intervention   2. Elevated troponin- type II MI from above. Do not suspect ACS  3. Hyperglycemia- A1c with PreDM.   insulin and accu checks  4

## 2019-07-29 NOTE — PROGRESS NOTES
[facility]  Progress Note    Maciel Gonzalez Patient Status:  Inpatient    5/3/1990 MRN [id number]   [facility] 6NE-A Attending Oneyda Dunaway MD   Hosp Day # 5 PCP Husam Garcia MD     Subjective:  Maciel Gonzalez is a(n) 30 yea 07/28/19  0421 07/29/19  0419   RBC 5.17   < > 4.61 4.45 3.81* 3.39*   HGB 15.7   < > 14.1 13.2 12.3* 11.7*   HCT 46.2   < > 42.1 40.0 34.8* 32.4*   MCV 89.4   < > 91.3 89.9 91.3 95.6   MCH 30.4   < > 30.6 29.7 32.3 34.5*   MCHC 34.0   < > 33.5 33.0 35.3 3 thickened liquids in the past  10. Acute kidney injury-slowly improving w gentle hydration, monitoring closely     Plan:  · Ongoing mild to moderate distention with possible ileus on KUB. Continue to monitor. Slowly advancing diet.   · Off IVF  · PCT mini

## 2019-07-29 NOTE — ANESTHESIA PREPROCEDURE EVALUATION
PRE-OP EVALUATION    Patient Name: Javed Coburn    Pre-op Diagnosis: * No pre-op diagnosis entered *    * No procedures listed *    * No surgeons found in log *    Pre-op vitals reviewed.   Temp: 99.1 °F (37.3 °C)  Pulse: 101  Resp: 21  BP: 88/71  SpO2: acetaminophen (TYLENOL) tab 650 mg 650 mg Oral Q6H PRN   [COMPLETED] ondansetron HCl (ZOFRAN) 4 MG/2ML injection      [COMPLETED] ondansetron HCl (ZOFRAN) injection 4 mg 4 mg Intravenous Once   [COMPLETED] digoxin (LANOXIN) 250 MCG/ML injection 250 mcg 250 spironolactone 25 MG Oral Tab Take 12.5 mg by mouth daily. Disp:  Rfl: 1   baclofen 10 MG Oral Tab Take 20 mg by mouth nightly. Disp:  Rfl: 2   Vitamin E 400 units Oral Tab Take 400 Units by mouth every morning.  Disp:  Rfl:    Coenzyme Q10 (COQ10 OR) T Available pre-op labs reviewed. Lab Results   Component Value Date    WBC 8.7 07/29/2019    RBC 3.39 (L) 07/29/2019    HGB 11.7 (L) 07/29/2019    HCT 32.4 (L) 07/29/2019    MCV 95.6 07/29/2019    MCH 34.5 (H) 07/29/2019    MCHC 36.1 07/29/2019    RDW 12. 8

## 2019-07-29 NOTE — PAYOR COMM NOTE
--------------  CONTINUED STAY REVIEW    Payor: Mark Mar #:  [id number]  Authorization Number: [id number]    Admit date: 7/24/19  Admit time: 5    Admitting Physician: DO Matteo Denney Abdomen: Soft, non-tender, non-distended, no masses, no guarding, no                rebound, positive BS                Extremity: No edema, no cyanosis                Neurological: Alert, interactive, no focal deficits     Lab Data Review: evaluation this morning  5. Vomiting questionable evolving ileus related to viral infection-abdominal distention continues  6. Stable chronic hypotension.  His blood pressures normally run in the low 90s. 7.  Hx Branden's ataxia with progressive muscle w 07/27/19  0409 07/28/19  0421   WBC 16.9* 17.2* 11.3*   HGB 14.1 13.2 12.3*   HCT 42.1 40.0 34.8*   .0 218.0 210.0      Recent Labs   Lab 07/26/19  0432 07/27/19  0409 07/27/19  1116 07/28/19  0421   * 162*  --  108*   BUN 22* 21*  --  17   C IVF  · PCT minimally elevated, Blood NGTD, urine culture pending, u/a w rare bacteria. Monitor and await results  · Renal function improving. Monitor LFTs  · Adjusted cardiac meds prn to improve HR. Appreciate cards input.   Possible EP evaluation tomorr of bacterial infection- would hold abx and f/u ID input   8. Elevated LFTs- suspect 2/2 above and hypotension. abd benign, good BM. KUB with possible ileus   1. Add on LFTs this AM  9. Lactic acidosis- mild and suspect 2/2 demand ischemia form above.  Low s Given (none) Topical Marie Metz, RN    7/28/2019 1546 Given (none) Topical Ramon Shah [facility]    7/28/2019 0825 Given (none) Topical Ming [place], [place]      Metoprolol Succinate ER (Toprol XL) 24 hr tab 25 mg     Date Action Dose Route User    7/29

## 2019-07-29 NOTE — PLAN OF CARE
Patient care assumed 1900, patient in bed with Amio bolus finishing and heparin infusing @ 1000u/hr, confirmed with RN at bedside. VSS with exception of Aflutter/Afib uncontrolled on monitor.  Frequently 100-115 bpm with periodic jumps to 140s but not susta

## 2019-07-29 NOTE — PAYOR COMM NOTE
--------------  CONTINUED STAY REVIEW    Payor: Mark Naidue #:  [id number]  Authorization Number: [id number]    Admit date: 7/24/19  Admit time: 5    Admitting Physician: DO Matteo Cordero 0.125 mg, Oral, Daily  •  Losartan Potassium (COZAAR) tab 25 mg, 25 mg, Oral, Daily  •  spironolactone (ALDACTONE) tab 12.5 mg, 12.5 mg, Oral, Daily        Physical Exam:  Blood pressure (!) 88/71, pulse 101, temperature 99.1 °F (37.3 °C), temperature sour and cardiomyopathy who is admitted with influenza, which is improving. He now has continued atrial flutter, often with rapid ventricular rates despite rate control.  I had a very long discussion with his mother regarding rhythm control options, which includ

## 2019-07-29 NOTE — PLAN OF CARE
0800 Pt refused mucinex this am states it makes him more nauseated. 0930 Dr Christiane Bernstein at bedside, talked with patient and Mom  concerning  the Ablation. Mom is very concerned about the anesthesia, Dr Christiane Bernstein states he will have anesthesia to talked with Mom.

## 2019-07-29 NOTE — OPERATIVE REPORT
EP Procedure Note      Procedures performed:  1. Central venous access (ultrasound guided)  2. Comprehensive EP study  3.  Catheter placed in coronary sinus (for CS recording and left atrial pacing)  4. cavotricuspid isthmus ablation using radiofrequency post EP study  2. Status post CTI ablation    Recommendations:  1. To recovery  2. 3 hour flat time after hemostasis  3. Start apixaban 5 mg PO BID tonight if no groin hematoma  4.  Start dofetilide 250 mcg PO BID, check ECG two hours after each dose to mon

## 2019-07-30 LAB
APTT PPP: 31.5 SECONDS (ref 25.4–36.1)
ATRIAL RATE: 300 BPM
ATRIAL RATE: 344 BPM
ATRIAL RATE: 85 BPM
GLUCOSE BLD-MCNC: 113 MG/DL (ref 70–99)
GLUCOSE BLD-MCNC: 123 MG/DL (ref 70–99)
GLUCOSE BLD-MCNC: 128 MG/DL (ref 70–99)
GLUCOSE BLD-MCNC: 197 MG/DL (ref 70–99)
NT-PROBNP SERPL-MCNC: 2772 PG/ML (ref ?–125)
P AXIS: 168 DEGREES
P AXIS: 44 DEGREES
P-R INTERVAL: 134 MS
POTASSIUM SERPL-SCNC: 4.7 MMOL/L (ref 3.5–5.1)
Q-T INTERVAL: 294 MS
Q-T INTERVAL: 310 MS
Q-T INTERVAL: 314 MS
QRS DURATION: 70 MS
QRS DURATION: 72 MS
QRS DURATION: 72 MS
QTC CALCULATION (BEZET): 368 MS
QTC CALCULATION (BEZET): 397 MS
QTC CALCULATION (BEZET): 405 MS
R AXIS: 180 DEGREES
R AXIS: 181 DEGREES
R AXIS: 191 DEGREES
T AXIS: -24 DEGREES
T AXIS: -74 DEGREES
T AXIS: 12 DEGREES
VENTRICULAR RATE: 100 BPM
VENTRICULAR RATE: 110 BPM
VENTRICULAR RATE: 85 BPM

## 2019-07-30 PROCEDURE — 99233 SBSQ HOSP IP/OBS HIGH 50: CPT | Performed by: HOSPITALIST

## 2019-07-30 RX ORDER — SODIUM CHLORIDE 9 MG/ML
INJECTION, SOLUTION INTRAVENOUS
Status: ACTIVE | OUTPATIENT
Start: 2019-07-30 | End: 2019-07-30

## 2019-07-30 RX ORDER — METOPROLOL SUCCINATE 50 MG/1
50 TABLET, EXTENDED RELEASE ORAL NIGHTLY
Status: DISCONTINUED | OUTPATIENT
Start: 2019-07-31 | End: 2019-08-02

## 2019-07-30 RX ORDER — CHLORHEXIDINE GLUCONATE 4 G/100ML
30 SOLUTION TOPICAL
Status: ACTIVE | OUTPATIENT
Start: 2019-07-30 | End: 2019-07-30

## 2019-07-30 RX ORDER — LOSARTAN POTASSIUM 25 MG/1
25 TABLET ORAL NIGHTLY
Status: DISCONTINUED | OUTPATIENT
Start: 2019-07-30 | End: 2019-08-02

## 2019-07-30 RX ORDER — BISACODYL 10 MG
10 SUPPOSITORY, RECTAL RECTAL
Status: DISCONTINUED | OUTPATIENT
Start: 2019-07-30 | End: 2019-08-02

## 2019-07-30 RX ORDER — SODIUM CHLORIDE 9 MG/ML
INJECTION, SOLUTION INTRAVENOUS CONTINUOUS
Status: ACTIVE | OUTPATIENT
Start: 2019-07-30 | End: 2019-07-30

## 2019-07-30 NOTE — PROGRESS NOTES
[facility]  Advanced Heart Failure Progress Note    Marilu Leventhal Patient Status:  Inpatient    5/3/1990 MRN [id number]   [facility] 6NE-A Attending Hira Queen MD   Hosp Day # 6 PCP Dg Graves MD     Subjective:  Loreleii Chlorhexidine Gluconate (HIBICLENS) 4 % liquid 30 mL 30 mL Topical On Call   0.9% NaCl infusion  Intravenous Continuous   DILTIAZEM BOLUS FROM BAG 5 mg infusion 5 mg Intravenous Once   And      diltiazem 100mg/100ml in NaCl (CARDIZEM) 1 mg/mL premix/add- LFTs     Ileus ([address]    Agree with dofetilide  Check NT-proBNP  Give losartan and metoprolol AT NIGHT   Do NOT hold metoprolol unless systolic BP is </=43 mmHg.   It is very heartbreaking to talk to the patient's mother, as she expects solutions to unsolvab

## 2019-07-30 NOTE — PROGRESS NOTES
[facility]  Progress Note     Michelle Vasquez Patient Status:  Inpatient    5/3/1990 MRN [id number]   [facility] 6NE-A Attending Oleg Rodriguez MD   Hosp Day # 6 PCP Mitesh Corona MD     Chief Complaint: Afib  S:  Feeling be 07/24/19  0825 07/24/19  1526   TROP 1.740* 1.560*        Imaging: Imaging data reviewed in Epic. ASSESSMENT / PLAN:   1. Aflutter RVR- rate better controlled   1. Amio has been off  2. BB, dig, Eliquis   3.  EP following- s/p ablation and cardioversion x2

## 2019-07-30 NOTE — CM/SW NOTE
MSW attempted to see patient to complete PHQ4. He is experiencing some delirium today. MSW will continue to follow for an appropriate time to address the anxiety and depression screening.     GUILLERMINA RamosW

## 2019-07-30 NOTE — PLAN OF CARE
Patient care assumed 1900, patient in bed, alert and oriented with vital signs stable. In and out of Afib on the monitor after ablation and cardioversion x 2 yesterday.  Somewhat confused overnight with occasional visual hallucinations and feeling like \"bl

## 2019-07-30 NOTE — PROGRESS NOTES
[facility]  Progress Note    Karle Means Patient Status:  Inpatient    5/3/1990 MRN [id number]   [facility] 6NE-A Attending Sarina Villarreal MD   Hosp Day # 6 PCP Raji White MD     ASSESSMENT  1.  Viral syndrome with parai TO 6     Patient Active Problem List:     Hyperglycemia     Atrial fibrillation with rapid ventricular response (HCC)     Upper respiratory tract infection, unspecified type     Elevated troponin     Cardiomyopathy (HCC)     TIA (obstructive sleep apnea) CA 9.0   < > 8.5 8.5 8.3*  --  7.9* 7.8*  --    ALB 4.3  --   --  3.5  --  3.3* 3.0* 2.9*  --       < > 136 136 138  --  136 138  --    K 4.5   < > 4.7 4.1 3.6 4.3 3.5 3.7 4.7      < > 100 101 103  --  103 105  --    CO2 25.0   < > 21.0 25.0 TID CC and HS   Metoclopramide HCl (REGLAN) injection 10 mg 10 mg Intravenous Q6H PRN   acetaminophen (TYLENOL) tab 650 mg 650 mg Oral Q6H PRN   ipratropium-albuterol (DUONEB) nebulizer solution 3 mL 3 mL Nebulization Q4H PRN   Normal Saline Flush 0.9 % in

## 2019-07-30 NOTE — PAYOR COMM NOTE
--------------  CONTINUED STAY REVIEW    Payor: Mark Mar #:  [id number]  Authorization Number: [id number]    Admit date: 7/24/19  Admit time: 5    Admitting Physician: DO Matteo Fritz Creatinine Clearance: 94.2 mL/min (based on SCr of 1.13 mg/dL). No results for input(s): PTP, INR in the last 168 hours. Recent Labs   Lab 07/24/19  0825 07/24/19  1526   TROP 1.740* 1.560*      Imaging: Imaging data reviewed in Epic.   ASSESSMENT / sulfate (VENTOLIN) (2.5 MG/3ML) 0.083% nebulizer solution 2.5 mg     Date Action Dose Route User    7/29/2019 1200 Given 2.5 mg Nebulization Rj Multani RCP      apixaban Jayce Rink) tab 5 mg     Date Action Dose Route User    7/30/2019 0859 Given 5 m 1224 Given 1 spray Each Nare Sol Ramsay RN      spironolactone (ALDACTONE) tab 12.5 mg     Date Action Dose Route User    7/30/2019 0900 Given 12.5 mg Oral Kirk Manriquez RN          Cardiology 7/30  Assessment/Plan:  Cedric Laguna is a 30 year

## 2019-07-30 NOTE — PROGRESS NOTES
[facility]    Electrophysiology Follow Up Note      Yury Espinal Patient Status:  Inpatient    5/3/1990 MRN [id number]   [facility] 6NE-A Attending Graham Mcgee MD   Hosp Day # 6 PCP Candie Meyers MD     Reas mg, Oral, Q6H PRN  •  ipratropium-albuterol (DUONEB) nebulizer solution 3 mL, 3 mL, Nebulization, Q4H PRN  •  Normal Saline Flush 0.9 % injection 10 mL, 10 mL, Intravenous, Q12H  •  ondansetron HCl (ZOFRAN) injection 4 mg, 4 mg, Intravenous, Q4H PRN  •  gu 91.3 95.6   MCH 30.4   < > 30.6 29.7 32.3 34.5*   MCHC 34.0   < > 33.5 33.0 35.3 36.1   RDW 11.9   < > 12.2 12.1 12.3 12.8   NEPRELIM 7.73*  --  12.55*  --   --   --    WBC 10.5   < > 16.9* 17.2* 11.3* 8.7   .0   < > 243.0 218.0 210.0 235.0    < > =

## 2019-07-30 NOTE — PLAN OF CARE
Pt working hard on BM throughout shift. Patient had good appetite during shift. Dry heaving periodically during shift. PRN reglan given, effectiveness noted. PRN suppository given for BM assistance.

## 2019-07-31 LAB
ALBUMIN SERPL-MCNC: 3.4 G/DL (ref 3.4–5)
ALBUMIN/GLOB SERPL: 0.9 {RATIO} (ref 1–2)
ALP LIVER SERPL-CCNC: 38 U/L (ref 45–117)
ALT SERPL-CCNC: 141 U/L (ref 16–61)
ANION GAP SERPL CALC-SCNC: 6 MMOL/L (ref 0–18)
AST SERPL-CCNC: 58 U/L (ref 15–37)
ATRIAL RATE: 81 BPM
ATRIAL RATE: 88 BPM
BILIRUB SERPL-MCNC: 0.9 MG/DL (ref 0.1–2)
BUN BLD-MCNC: 16 MG/DL (ref 7–18)
BUN/CREAT SERPL: 12.8 (ref 10–20)
CALCIUM BLD-MCNC: 9.1 MG/DL (ref 8.5–10.1)
CHLORIDE SERPL-SCNC: 104 MMOL/L (ref 98–112)
CO2 SERPL-SCNC: 30 MMOL/L (ref 21–32)
CREAT BLD-MCNC: 1.25 MG/DL (ref 0.7–1.3)
DEPRECATED RDW RBC AUTO: 40.7 FL (ref 35.1–46.3)
ERYTHROCYTE [DISTWIDTH] IN BLOOD BY AUTOMATED COUNT: 12.8 % (ref 11–15)
GLOBULIN PLAS-MCNC: 3.8 G/DL (ref 2.8–4.4)
GLUCOSE BLD-MCNC: 108 MG/DL (ref 70–99)
GLUCOSE BLD-MCNC: 135 MG/DL (ref 70–99)
GLUCOSE BLD-MCNC: 137 MG/DL (ref 70–99)
GLUCOSE BLD-MCNC: 138 MG/DL (ref 70–99)
GLUCOSE BLD-MCNC: 99 MG/DL (ref 70–99)
HCT VFR BLD AUTO: 37.2 % (ref 39–53)
HGB BLD-MCNC: 12.8 G/DL (ref 13–17.5)
M PROTEIN MFR SERPL ELPH: 7.2 G/DL (ref 6.4–8.2)
MCH RBC QN AUTO: 30.9 PG (ref 26–34)
MCHC RBC AUTO-ENTMCNC: 34.4 G/DL (ref 31–37)
MCV RBC AUTO: 89.9 FL (ref 80–100)
OSMOLALITY SERPL CALC.SUM OF ELEC: 292 MOSM/KG (ref 275–295)
P AXIS: 42 DEGREES
P AXIS: 43 DEGREES
P-R INTERVAL: 132 MS
P-R INTERVAL: 134 MS
PLATELET # BLD AUTO: 285 10(3)UL (ref 150–450)
POTASSIUM SERPL-SCNC: 3.8 MMOL/L (ref 3.5–5.1)
Q-T INTERVAL: 348 MS
Q-T INTERVAL: 370 MS
QRS DURATION: 72 MS
QRS DURATION: 72 MS
QTC CALCULATION (BEZET): 421 MS
QTC CALCULATION (BEZET): 429 MS
R AXIS: 177 DEGREES
R AXIS: 184 DEGREES
RBC # BLD AUTO: 4.14 X10(6)UL (ref 4.3–5.7)
SODIUM SERPL-SCNC: 140 MMOL/L (ref 136–145)
T AXIS: -7 DEGREES
T AXIS: -71 DEGREES
VENTRICULAR RATE: 81 BPM
VENTRICULAR RATE: 88 BPM
WBC # BLD AUTO: 12.6 X10(3) UL (ref 4–11)

## 2019-07-31 PROCEDURE — 99232 SBSQ HOSP IP/OBS MODERATE 35: CPT | Performed by: HOSPITALIST

## 2019-07-31 RX ORDER — MAGNESIUM OXIDE 400 MG (241.3 MG MAGNESIUM) TABLET
3 TABLET NIGHTLY
Status: DISCONTINUED | OUTPATIENT
Start: 2019-07-31 | End: 2019-08-02

## 2019-07-31 RX ORDER — POTASSIUM CHLORIDE 1.5 G/1.77G
40 POWDER, FOR SOLUTION ORAL ONCE
Status: COMPLETED | OUTPATIENT
Start: 2019-07-31 | End: 2019-07-31

## 2019-07-31 RX ORDER — FENOPROFEN CALCIUM 200 MG
CAPSULE ORAL 4 TIMES DAILY PRN
Status: DISCONTINUED | OUTPATIENT
Start: 2019-07-31 | End: 2019-08-02

## 2019-07-31 NOTE — PAYOR COMM NOTE
--------------  CONTINUED STAY REVIEW    Payor: Mark Mar #:  [id number]  Authorization Number: [id number]    Admit date: 19  Admit time: Parker Velasquez  Patient Status:  Inpatient    5/3 last 168 hours. Recent Labs   Lab 07/24/19  1526   TROP 1.560*      Imaging: Imaging data reviewed in Epic. ASSESSMENT / PLAN:      1. Aflutter RVR  1. Resolved   2. CCB, BB, dig, Eliquis   3. EP following- s/p ablation and cardioversion x2 on 7/29. Mars Resendez RN      guaiFENesin (ROBITUSSIN) 100mg/5ml LIQUID 100 mg     Date Action Dose Route User    7/31/2019 0530 Given 100 mg Oral Tiffany Khanna RN      Losartan Potassium (COZAAR) tab 25 mg     Date Action Dose Route User    7/30/2019 2029

## 2019-07-31 NOTE — PLAN OF CARE
Patient care assumed 1900, VSS, patient comfortable. 2 Bms overnight. Delirium seems to have improved with sleep. No reported hallucinations after 2100.

## 2019-07-31 NOTE — PROGRESS NOTES
[facility]  Progress Note    Mehdi Ladd Patient Status:  Inpatient    5/3/1990 MRN [id number]   [facility] 6NE-A Attending Park Eddy, DO   Hosp Day # 7 PCP Rob Baez MD     ASSESSMENT  1.  Viral syndrome with parain NEUROMUSCULAR DZ  - SW PLEASE ORDER    FRAGMENTED  SLEEP   -discussed with mother and patient -  It is expected with 12 h in bed - provided information to condense sleep if desired    Hypoxia   -Patient at home usually can used BIPAP till 3 am, then sleeps lower extremity edema, no cyanosis    Lab Data Review:  Recent Labs     07/29/19  0419 07/31/19  0935   WBC 8.7 12.6*   HGB 11.7* 12.8*   .0 285.0       Recent Labs   Lab 07/26/19  0432 07/27/19  0409 07/27/19  1116 07/28/19  0421 07/29/19  0419 07/ Nightly   PEG 3350 (MIRALAX) powder packet 17 g 17 g Oral Daily PRN   bisacodyl (DULCOLAX) EC tab 5 mg 5 mg Oral Daily PRN   Insulin Aspart Pen (NOVOLOG) 100 UNIT/ML flexpen 1-10 Units 1-10 Units Subcutaneous TID CC and HS   Metoclopramide HCl (REGLAN) inj

## 2019-07-31 NOTE — PROGRESS NOTES
[facility]  Progress Note     Silvano Beam Patient Status:  Inpatient    5/3/1990 MRN [id number]   [facility] 6NE-A Attending Bi Puri MD   Hosp Day # 7 PCP Ankit Neil MD     Chief Complaint: Afib     S:  Patient Eliquis   3. EP following- s/p ablation and cardioversion x2 on 7/29.   4. Now on Dofetilide with serial EKGs 2 hours after doses given   2. Hypertrophic Cardiomyopathy EF 25%  1. Per Cardiology   3. Friedreich's Ataxia  4.  Elevated troponin on admission-

## 2019-07-31 NOTE — CM/SW NOTE
Sw received order for home Trilogy. Sw met with pt and his mother. Discussed ordering trilogy- he has an old bipap at home from [address] [place] [address]. Discussed using [facility] for Trilogy. [facility] order form is on chart.   Initial referral info sent to [address]

## 2019-07-31 NOTE — DIETARY NOTE
[facility]    NUTRITION INITIAL ASSESSMENT    Pt does not meet malnutrition criteria.     NUTRITION DIAGNOSIS/PROBLEM:    Self feeding difficulty related to impaired cognitive ability (nuerological) as evidenced by dependent on family to feed him    NU visual exam.    NUTRITION PRESCRIPTION: using CBW 67.2 kg  Calories: 5452-4566 calories/day (25-30 calories per kg)  Protein:  grams protein/day (1.2-1.5 grams protein per kg)  Fluid: ~1 ml/kcal or per MD discretion    MONITOR AND EVALUATE/NUTRITION

## 2019-07-31 NOTE — PROGRESS NOTES
[facility]    Electrophysiology Follow Up Note      Yury Espinal Patient Status:  Inpatient    5/3/1990 MRN [id number]   [facility] 6NE-A Attending Juana Hinojosa, [facility] Day # 7 PCP MD Cassandra Garciao Q12H  •  ondansetron HCl (ZOFRAN) injection 4 mg, 4 mg, Intravenous, Q4H PRN  •  guaiFENesin ER (MUCINEX) 12 hr tab 600 mg, 600 mg, Oral, BID  •  guaiFENesin (ROBITUSSIN) 100mg/5ml LIQUID 100 mg, 100 mg, Oral, Q4H PRN  •  baclofen (LIORESAL) tab 20 mg, 20 rhythm, QTc WNL      Assessment/Plan:  Harris Ku is a 30year old 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     1) atypical atrial flutter, atrial fibrillation  - baseline is sinus rhyt

## 2019-08-01 LAB
ATRIAL RATE: 81 BPM
ATRIAL RATE: 96 BPM
GLUCOSE BLD-MCNC: 103 MG/DL (ref 70–99)
GLUCOSE BLD-MCNC: 118 MG/DL (ref 70–99)
GLUCOSE BLD-MCNC: 125 MG/DL (ref 70–99)
GLUCOSE BLD-MCNC: 130 MG/DL (ref 70–99)
P AXIS: 39 DEGREES
P-R INTERVAL: 132 MS
P-R INTERVAL: 136 MS
POTASSIUM SERPL-SCNC: 3.9 MMOL/L (ref 3.5–5.1)
Q-T INTERVAL: 332 MS
Q-T INTERVAL: 364 MS
QRS DURATION: 70 MS
QRS DURATION: 70 MS
QTC CALCULATION (BEZET): 385 MS
QTC CALCULATION (BEZET): 425 MS
R AXIS: 181 DEGREES
R AXIS: 181 DEGREES
T AXIS: -21 DEGREES
T AXIS: -42 DEGREES
VENTRICULAR RATE: 81 BPM
VENTRICULAR RATE: 82 BPM

## 2019-08-01 PROCEDURE — 99232 SBSQ HOSP IP/OBS MODERATE 35: CPT | Performed by: HOSPITALIST

## 2019-08-01 NOTE — CM/SW NOTE
Sw spoke to Ha[employer] Secure Maninder at [employer]. She will work with RT here to see if pt qualifies for Trilogy at home. Will need order updated also. Mirtha Stark, [facility] /Dischage Planner  [phone]  Pager [phone]

## 2019-08-01 NOTE — PROGRESS NOTES
08/01/19 1115   Vent Information   $ RT Standby Charge (per 15 min) 2  (NIF/Bedside PFT)   Spontaneous Parameters   Negative Inspiratory Force -18

## 2019-08-01 NOTE — RESPIRATORY THERAPY NOTE
Left message for Catarina Wen LCSW, regarding patients Trilogy. Stated all documentation has been sent to [facility].

## 2019-08-01 NOTE — PLAN OF CARE
Assumed care of patient at [facility] appropriately; speech impaired/slurred FA related  Did report seeing lines  Today; no other hallucination;    Whee;chair bound; turn q2  RA; trilogy at [facility]; cough dry; nonproductive  SR mostly on telemetry; had

## 2019-08-01 NOTE — PROGRESS NOTES
[facility]  Progress Note    Yordan Crews Patient Status:  Inpatient    5/3/1990 MRN [id number]   [facility] 6NE-A Attending Andree Villafana, DO   Hosp Day # 8 PCP Sly Lynne MD     ASSESSMENT  1.  Viral syndrome with parain provided information to condense sleep if desired    Hypoxia   -Patient at home usually can used BIPAP till 3 am, then sleeps few hours without -  - up to 12 h in bed total on regular bases - will assess Sao2 - might benefit from nocturnal O2 when not on N 89.9   MCH 30.6   < > 32.3 34.5* 30.9   MCHC 33.5   < > 35.3 36.1 34.4   RDW 12.2   < > 12.3 12.8 12.8   NEPRELIM 12.55*  --   --   --   --    WBC 16.9*   < > 11.3* 8.7 12.6*   .0   < > 210.0 235.0 285.0    < > = values in this interval not displaye Rectal Daily PRN   lactated ringers infusion  Intravenous Continuous   dofetilide (TIKOSYN) cap 250 mcg 250 mcg Oral Q12H   apixaban (ELIQUIS) tab 5 mg 5 mg Oral BID   Saline Nasal Spray (SALINE MIST) 1 spray 1 spray Each Nare Q3H PRN   PEG 3350 (MIRALAX)

## 2019-08-01 NOTE — PROGRESS NOTES
[facility]  Advanced Heart Failure Progress Note    Claudia Yu Patient Status:  Inpatient    5/3/1990 MRN [id number]   [facility] 6NE-A Attending Lubna Garcia, DO   Hosp Day # 8 PCP Rekha Larueano MD     Subjective:  Dat Vu Facility-Administered Medications:  hydrocortisone 1 % lotion  Topical QID PRN   melatonin tab TABS 3 mg 3 mg Oral Nightly   DILTIAZEM BOLUS FROM BAG 5 mg infusion 5 mg Intravenous Once   And      diltiazem 100mg/100ml in NaCl (CARDIZEM) 1 mg/mL premix/add agree with nocturnal O2  Given very low EF, the recommendation is that he receives at least 6 doses of dofetilide before he is discharged. Therefore we will observe overnight and possibly discharge home tomorrow. We will repeat pro-BNP before discharge.

## 2019-08-01 NOTE — RESPIRATORY THERAPY NOTE
Patient completed a bedside PFT. Showed Severe Restriction. FEV1 best value 1.10.   Test results in chart

## 2019-08-01 NOTE — PROGRESS NOTES
[facility]  Progress Note     Drue Kawasaki Patient Status:  Inpatient    5/3/1990 MRN [id number]   [facility] 6NE-A Attending Lester Martins MD   Hosp Day # 8 PCP Ryan Ayon MD     Chief Complaint: Afib     S:  Patient in the last 168 hours. Imaging: Imaging data reviewed in Epic. ASSESSMENT / PLAN:     1. Aflutter RVR  1. CCB, BB, dig, Eliquis   2. EP following- s/p ablation and cardioversion x2 on 7/29. 3.  Now on Dofetilide with serial EKGs 2 hours after doses g

## 2019-08-01 NOTE — PAYOR COMM NOTE
--------------  CONTINUED STAY REVIEW    Payor: Mark Naidue #:  [id number]  Authorization Number: [id number]    Admit date: 7/24/19  Admit time: 5    Admitting Physician: DO Matteo Conner 07/29/2019 15   07/28/2019 17          Creatinine (mg/dL)   Date Value   07/31/2019 1.25   07/29/2019 1.13   07/28/2019 1.10         Medications:     Current Facility-Administered Medications:  hydrocortisone 1 % lotion   Topical QID PRN   melatonin tab apnea)     Parainfluenza     WILLIAM (acute kidney injury) ([facility] [address]     Elevated LFTs     Ileus [facility])        Dr. Amrita Simmons note appreciated. Even at rest O2 is 89%-90%;  I agree with nocturnal O2  Given very low EF, the recommendation is that he receives at [place] [address] Maisha Narvaez RN    7/31/2019 1525 Given 10 mL Intravenous Vin CATHI Garcia      spironolactone (ALDACTONE) tab 12.5 mg     Date Action Dose Route User    8/1/2019 1421 Given 12.5 mg Oral Vin CATHI Garcia        Date/Time Temp Pulse Resp BP SpO2

## 2019-08-01 NOTE — PLAN OF CARE
Assumed care of patient at 7am  AOx4 nods appropriately; speech slurred; weakness to all extremities per baseline  Denies hallucination  Denies pain  SR on telemetry; no edema  Abdomen soft distended + BS; tolerating diet  Voids per urinal  Turn q 2  Mom a bleeding and/or hematoma  - Assess quality of pulses, skin color and temperature  - Assess for signs of decreased coronary artery perfusion - ex.  Angina  - Evaluate fluid balance, assess for edema, trend weights  Outcome: Progressing  Goal: Absence of card

## 2019-08-01 NOTE — PROGRESS NOTES
Assumed care at [address]. Pt. Resting in bed w/ mom at bedside. Pt. Is alert and nods yes/no appropriately. VSS, denies pain. Pt. Able to talk w/ some slurred speech r/t FA. Pt. Requesting to keep trilogy on overnight, no episodes of desaturation below 88%.  Pu

## 2019-08-02 VITALS
SYSTOLIC BLOOD PRESSURE: 109 MMHG | TEMPERATURE: 98 F | WEIGHT: 148.13 LBS | BODY MASS INDEX: 22.45 KG/M2 | HEIGHT: 68 IN | DIASTOLIC BLOOD PRESSURE: 85 MMHG | OXYGEN SATURATION: 87 % | RESPIRATION RATE: 9 BRPM | HEART RATE: 76 BPM

## 2019-08-02 LAB
ATRIAL RATE: 75 BPM
ATRIAL RATE: 81 BPM
GLUCOSE BLD-MCNC: 100 MG/DL (ref 70–99)
GLUCOSE BLD-MCNC: 90 MG/DL (ref 70–99)
NT-PROBNP SERPL-MCNC: 713 PG/ML (ref ?–125)
P AXIS: 45 DEGREES
P AXIS: 47 DEGREES
P-R INTERVAL: 124 MS
P-R INTERVAL: 124 MS
Q-T INTERVAL: 354 MS
Q-T INTERVAL: 396 MS
QRS DURATION: 72 MS
QRS DURATION: 74 MS
QTC CALCULATION (BEZET): 411 MS
QTC CALCULATION (BEZET): 442 MS
R AXIS: 179 DEGREES
R AXIS: 184 DEGREES
T AXIS: -30 DEGREES
T AXIS: -48 DEGREES
VENTRICULAR RATE: 75 BPM
VENTRICULAR RATE: 81 BPM

## 2019-08-02 PROCEDURE — 99239 HOSP IP/OBS DSCHRG MGMT >30: CPT | Performed by: HOSPITALIST

## 2019-08-02 RX ORDER — DOFETILIDE 0.25 MG/1
250 CAPSULE ORAL EVERY 12 HOURS
Qty: 60 CAPSULE | Refills: 3 | Status: SHIPPED | OUTPATIENT
Start: 2019-08-02 | End: 2020-03-10

## 2019-08-02 NOTE — PLAN OF CARE
Reviewed discharge instructions with mom and patient. Scripts sent and called in to [facility]. Vitals stable. Follow up appt discussed.

## 2019-08-02 NOTE — PROGRESS NOTES
[facility]  Progress Note    Claudia Ireland Patient Status:  Inpatient    5/3/1990 MRN [id number]   [facility] 6NE-A Attending Go Son, [address] [facility] Day # 9 GERARDO Sue MD     ASSESSMENT  1.  Viral syndrome with parain provided information to condense sleep if desired    Hypoxia   -Patient at home usually can use BIPAP till 3 am, then sleeps few hours without -  - up to 12 h in bed total on regular bases - will assess Sao2 - might benefit from nocturnal O2 when not on NI MCH 32.3 34.5* 30.9   MCHC 35.3 36.1 34.4   RDW 12.3 12.8 12.8   WBC 11.3* 8.7 12.6*   .0 235.0 285.0             Recent Labs   Lab 07/27/19  0409 07/27/19  1116 07/28/19  0421 07/29/19  0419 07/30/19  0432 07/31/19  0410 08/01/19  0429   * packet 17 g 17 g Oral Daily PRN   bisacodyl (DULCOLAX) EC tab 5 mg 5 mg Oral Daily PRN   Insulin Aspart Pen (NOVOLOG) 100 UNIT/ML flexpen 1-10 Units 1-10 Units Subcutaneous TID CC and HS   Metoclopramide HCl (REGLAN) injection 10 mg 10 mg Intravenous Q6H P

## 2019-08-02 NOTE — PROGRESS NOTES
[facility]  Advanced Heart Failure Progress Note    oSnny Coello Patient Status:  Inpatient    5/3/1990 MRN [id number]   [facility] 6NE-A Attending Tayla Roca, DO   Hosp Day # 9 PCP Angelic Llanos MD     Subjective:  Luis Enrique bains in NaCl (CARDIZEM) 1 mg/mL premix/add-vantage 5 mg/hr Intravenous Continuous   Metoprolol Succinate ER (Toprol XL) 24 hr tab 50 mg 50 mg Oral Nightly   Losartan Potassium (COZAAR) tab 25 mg 25 mg Oral Nightly   bisacodyl (DULCOLAX) rectal suppository 10 mg [facility] 4th [address]  [address], [address] [place], [address] [address]  Phone: [phone]  Fax: [phone]  E-mail: [email or web address]    8/2/2019  10:00 AM

## 2019-08-02 NOTE — CM/SW NOTE
08/02/19 1500   Discharge disposition   Expected discharge disposition Home-Health   Name of Facillity/Home Care/Hospice   ([place] [address] [phone])   [facility] provider Nicko KANG assisted with pre-auth for Eliquis.   Eliquis 30 day free trial card

## 2019-08-02 NOTE — PLAN OF CARE
Assumed care for this patient at 0730: patient alert and oriented. Nods and gestures. Per mom still having hallucinations overnight. NSR on the monitor with occasional PACS. Tikosyn per cards. Prolonged QT noted. Tolerated trilogy overnight.  Per pulmonary bleeding and/or hematoma  - Assess quality of pulses, skin color and temperature  - Assess for signs of decreased coronary artery perfusion - ex.  Angina  - Evaluate fluid balance, assess for edema, trend weights  Outcome: Progressing  Goal: Absence of card

## 2019-08-02 NOTE — CM/SW NOTE
Martha Bejarano approved pt for home Trilogy. Referred pt to [facility] - await acceptance. Mother wanted info on purchasing a home SCD machine - provided her with contact number for [facility].  HITESH Stark  /[place]arcia[place] Heather

## 2019-08-02 NOTE — PLAN OF CARE
Assumed care at [address], patient resting in bed, A/Ox4, expressive aphasia and muscle weakness in all extremities per baseline. Denies hallucinations/pain. On room air, Trillogy worn overnight for sleep. NSR with frequent PACs per tele.  Abdomen soft/distended

## 2019-08-02 NOTE — CM/SW NOTE
[facility] cannot staff pt  Referral made to [facility] - await response. Mirtha Stark, [address] [facility] /Dischage Planner  [phone]  Pager [phone]

## 2019-08-04 NOTE — PAYOR COMM NOTE
--------------  DISCHARGE REVIEW    Payor: Mark Mar #:  [id number]  Authorization Number: [id number]    Admit date: 7/24/19  Admit time:  1101  Discharge Date: 8/2/2019  2:45 PM     Admitting Physician: Isidro Man no nausea or vomiting visualized. There is no diarrhea or gross hematuria. There is no skin changes or rashes. He did seem to me more washed out or pale per the parents. He did have a low-grade temperature of 100.   Is been no sputum production but did same name was removed. Continue taking this medication, and follow the directions you see here. Take 50 mg by mouth daily.  To take along with the 25 mg for total dose of 75 mg   Refills:  0        CONTINUE taking these medications      Instructions Pr rhonchi. Cardiovascular: S1, S2. Regular rate and rhythm. No murmurs, rubs or gallops. Abdomen: Soft, nontender, nondistended. Positive bowel sounds. No rebound or guarding. Extremities: No edema.   -----------------------------------------------------

## 2019-08-05 NOTE — PROCEDURES
This test includes spirometry and flow volume loop done at the bedside during an inpatient hospitalization. Spirometry and flow volume loop appear normal with no evidence of airway obstruction     Spirometry  suggests  restriction.  Complete PFT with cee

## 2019-09-03 ENCOUNTER — HOSPITAL ENCOUNTER (OUTPATIENT)
Dept: CV DIAGNOSTICS | Facility: HOSPITAL | Age: 29
Discharge: HOME | End: 2019-09-03
Attending: INTERNAL MEDICINE
Payer: MEDICAID

## 2019-09-03 DIAGNOSIS — R00.2 PALPITATIONS: ICD-10-CM

## 2019-09-03 PROCEDURE — 93226 XTRNL ECG REC<48 HR SCAN A/R: CPT | Performed by: INTERNAL MEDICINE

## 2019-09-03 PROCEDURE — 93225 XTRNL ECG REC<48 HRS REC: CPT | Performed by: INTERNAL MEDICINE

## 2019-09-03 PROCEDURE — 93227 XTRNL ECG REC<48 HR R&I: CPT | Performed by: INTERNAL MEDICINE

## 2019-10-01 ENCOUNTER — HOSPITAL ENCOUNTER (OUTPATIENT)
Dept: CV DIAGNOSTICS | Facility: HOSPITAL | Age: 29
Discharge: HOME | End: 2019-10-01
Attending: INTERNAL MEDICINE
Payer: MEDICAID

## 2019-10-01 DIAGNOSIS — R00.2 PALPITATIONS: ICD-10-CM

## 2019-10-01 PROCEDURE — 93225 XTRNL ECG REC<48 HRS REC: CPT | Performed by: INTERNAL MEDICINE

## 2019-10-01 PROCEDURE — 93226 XTRNL ECG REC<48 HR SCAN A/R: CPT | Performed by: INTERNAL MEDICINE

## 2019-10-01 PROCEDURE — 93227 XTRNL ECG REC<48 HR R&I: CPT | Performed by: INTERNAL MEDICINE

## 2019-12-16 ENCOUNTER — HOSPITAL ENCOUNTER (INPATIENT)
Facility: HOSPITAL | Age: 29
LOS: 1 days | Discharge: HOME | DRG: 194 | End: 2019-12-17
Attending: INTERNAL MEDICINE | Admitting: INTERNAL MEDICINE
Payer: MEDICAID

## 2019-12-16 PROBLEM — J18.9 PNEUMONIA: Status: ACTIVE | Noted: 2019-12-16

## 2019-12-16 PROCEDURE — 99223 1ST HOSP IP/OBS HIGH 75: CPT | Performed by: HOSPITALIST

## 2019-12-16 RX ORDER — ALBUTEROL SULFATE 2.5 MG/3ML
2.5 SOLUTION RESPIRATORY (INHALATION) EVERY 2 HOUR PRN
Status: DISCONTINUED | OUTPATIENT
Start: 2019-12-16 | End: 2019-12-17

## 2019-12-16 RX ORDER — SPIRONOLACTONE 25 MG/1
12.5 TABLET ORAL DAILY
Status: DISCONTINUED | OUTPATIENT
Start: 2019-12-17 | End: 2019-12-17

## 2019-12-16 RX ORDER — BACLOFEN 20 MG/1
20 TABLET ORAL NIGHTLY
Status: DISCONTINUED | OUTPATIENT
Start: 2019-12-16 | End: 2019-12-17

## 2019-12-16 RX ORDER — LOSARTAN POTASSIUM 25 MG/1
25 TABLET ORAL NIGHTLY
Status: DISCONTINUED | OUTPATIENT
Start: 2019-12-16 | End: 2019-12-17

## 2019-12-16 RX ORDER — ENOXAPARIN SODIUM 100 MG/ML
40 INJECTION SUBCUTANEOUS DAILY
Status: DISCONTINUED | OUTPATIENT
Start: 2019-12-17 | End: 2019-12-17

## 2019-12-16 RX ORDER — METOCLOPRAMIDE HYDROCHLORIDE 5 MG/ML
10 INJECTION INTRAMUSCULAR; INTRAVENOUS EVERY 8 HOURS PRN
Status: DISCONTINUED | OUTPATIENT
Start: 2019-12-16 | End: 2019-12-17

## 2019-12-16 RX ORDER — DOFETILIDE 0.25 MG/1
250 CAPSULE ORAL EVERY 12 HOURS
Status: DISCONTINUED | OUTPATIENT
Start: 2019-12-17 | End: 2019-12-17

## 2019-12-16 RX ORDER — DIGOXIN 250 MCG
250 TABLET ORAL DAILY
Status: DISCONTINUED | OUTPATIENT
Start: 2019-12-17 | End: 2019-12-17

## 2019-12-16 RX ORDER — METOPROLOL SUCCINATE 50 MG/1
50 TABLET, EXTENDED RELEASE ORAL NIGHTLY
Status: DISCONTINUED | OUTPATIENT
Start: 2019-12-16 | End: 2019-12-17

## 2019-12-16 RX ORDER — ACETAMINOPHEN 325 MG/1
650 TABLET ORAL EVERY 6 HOURS PRN
Status: DISCONTINUED | OUTPATIENT
Start: 2019-12-16 | End: 2019-12-17

## 2019-12-17 ENCOUNTER — APPOINTMENT (OUTPATIENT)
Dept: GENERAL RADIOLOGY | Facility: HOSPITAL | Age: 29
DRG: 194 | End: 2019-12-17
Attending: HOSPITALIST
Payer: MEDICAID

## 2019-12-17 VITALS
TEMPERATURE: 99 F | DIASTOLIC BLOOD PRESSURE: 56 MMHG | RESPIRATION RATE: 20 BRPM | WEIGHT: 144 LBS | BODY MASS INDEX: 22 KG/M2 | HEART RATE: 99 BPM | SYSTOLIC BLOOD PRESSURE: 91 MMHG | OXYGEN SATURATION: 96 %

## 2019-12-17 PROCEDURE — 71045 X-RAY EXAM CHEST 1 VIEW: CPT | Performed by: HOSPITALIST

## 2019-12-17 PROCEDURE — 99239 HOSP IP/OBS DSCHRG MGMT >30: CPT | Performed by: HOSPITALIST

## 2019-12-17 RX ORDER — POTASSIUM CHLORIDE 1.5 G/1.77G
40 POWDER, FOR SOLUTION ORAL ONCE
Status: DISCONTINUED | OUTPATIENT
Start: 2019-12-17 | End: 2019-12-17

## 2019-12-17 NOTE — PLAN OF CARE
Assumed pt care at 0730. Pt in bed resting quietly with mother at the bedside. No s/s of acute distress noted at this time. VSS. Pt denies any pain . Alert and oriented X 4. Pt is wearing a brief, turning q2hr.  Pt scheduled for CXR today Provide assistive devices as appropriate  - Consider OT/PT consult to assist with strengthening/mobility  - Encourage toileting schedule  Outcome: Progressing     Problem: DISCHARGE PLANNING  Goal: Discharge to home or other facility with appropriate resou

## 2019-12-17 NOTE — H&P
[facility]  History and Physical     Paz Kay Patient Status:  Inpatient    5/3/1990 MRN [id number]   [facility] 8NE-A Attending Génesis Merritt MD   Hosp Day # 0 PCP Chayito Mcghee MD     Chief Complaint: cough  History (36.7 °C) (Oral)   Resp 18   General: averbal; nad  HEENT: Normocephalic atraumatic. Moist mucous membranes. EOM-I. PERRLA. Anicteric. Respiratory: somewhat course anterior fields; no wheezing. Cardiovascular: S1, S2. Regular rate and rhythm.  No murmurs, with patient and dr. Mitra Pham MD  12/16/2019

## 2019-12-17 NOTE — PAYOR COMM NOTE
--------------  ADMISSION REVIEW     Payor: Mark Mar #:  [id number]  Authorization Number: [id number]    Admit date: 12/16/19  Admit time: 2305       Admitting Physician: Hugo Arreaga MD  Attending Physician: Disp: 60 capsule, Rfl: 3  Losartan Potassium 25 MG Oral Tab, Take 25 mg by mouth nightly.  , Disp: , Rfl: 10  Metoprolol Succinate ER 50 MG Oral Tablet 24 Hr, Take 50 mg by mouth nightly.  , Disp: , Rfl: 0  digoxin 0.125 MG Oral Tab, Take 250 mcg by mouth rocephin and doxy for now (avoid azithro cause of tikosyn and QT concerns); CBC in am  2. jad-per protocol  3. afib parox-cards consult; defer anticoag to cards  4. friedrichs ataxis; wheelchari bound and averbal; hard of hearing and vision impaired  5.  Ch 40 MG/0.4ML injection 40 mg     Date Action Dose Route User    12/17/2019 0823 Given 40 mg Subcutaneous (Left Lower Abdomen) Jose E Salvador RN      Metoprolol Succinate ER (Toprol XL) 24 hr tab 50 mg     Date Action Dose Route User    12/17/2019 0027 Gi MBP/ADD-vantage, 1 g, Intravenous, Q24H  •  albuterol sulfate (VENTOLIN) (2.5 MG/3ML) 0.083% nebulizer solution 2.5 mg, 2.5 mg, Nebulization, Q2H PRN  •  baclofen (LIORESAL) tab 20 mg, 20 mg, Oral, Nightly  •  [START ON 12/17/2019] digoxin (LANOXIN) tab 25    Laboratory Data:   no labs checked at [facility] yet     Imaging:  Tele: sinus mechanism     Impression:  Principal Problem:    Pneumonia  Hx of atrial flutter s/p ablation  Sinus tachycardia - due to suspected PNA  Cardiomyopathy     Recommendations:  -

## 2019-12-17 NOTE — CM/SW NOTE
SW consult order noted for qualifications of home nocturnal oxygen. Pt will need an overnight oximetry showing 5 episodes of desat 88 or below within a two hour time frame and qualifying diagnosis in order to qualify for nocturnal oxygen.  Awaiting order an

## 2019-12-17 NOTE — CONSULTS
[facility]  Cardiology Consultation    Rancho Ma Patient Status:  Inpatient    5/3/1990 MRN [id number]   [facility] 8NE-A Attending Leola Reed MD   Hosp Day # 0 PCP Nithya Fan MD     Reason for Consultation:  Afib/tac 250 mcg, 250 mcg, Oral, Daily  •  [START ON 12/17/2019] dofetilide (TIKOSYN) cap 250 mcg, 250 mcg, Oral, Q12H  •  [START ON 12/17/2019] Enoxaparin Sodium (LOVENOX) 40 MG/0.4ML injection 40 mg, 40 mg, Subcutaneous, Daily  •  acetaminophen (TYLENOL) tab 650 telemetry  - give 50 mg metoprolol tonight  - resume dofetilide 250 mg BID  - hold losartan and spironolactone as he was hypotensive earlier today  - ECG and CXR in AM tomorrow  - CBC, CMP, Mg daily  - check procalcitonin    Thank you for allowing me to pa

## 2019-12-17 NOTE — PROGRESS NOTES
[facility]  Cardiology Progress Note    Silvano Beam Patient Status:  Inpatient    5/3/1990 MRN [id number]   [facility] 8NE-A Attending Bi Puri MD   Hosp Day # 1 PCP Ankit Neil MD     Subjective:  Feeling better, no Intravenous Q12H   • spironolactone  12.5 mg Oral Daily   • Metoprolol Succinate ER  50 mg Oral Nightly   • Losartan Potassium  25 mg Oral Nightly         Assessment:  • Suspected PNA, fevers- Calcitonin 0.08, respiratory panel (-), lungs clear- sucpect th

## 2019-12-17 NOTE — PLAN OF CARE
Direct admit from [facility]  Afib per report while there- NSR upon arrival- currently  BP 's/ 60's  Dry non productive cough  Admission navigator completed  Flu ruled out at [facility]  Extended panel ordered here, droplet precautions during r/o

## 2019-12-17 NOTE — PROGRESS NOTES
[facility]  Progress Note     Yordan Crews Patient Status:  Inpatient    5/3/1990 MRN [id number]   [facility] 8NE-A Attending Gerardo Mehta MD   Hosp Day # 1 PCP Sly Lynne MD     Chief Complaint: PNA  S:  Feels alfreda (avoid azithro cause of tikosyn and QT concerns)  2. TIA-per protocol  3. PAF- in SR. Cards consult; not on anticoagulation. 4. Friedrichs ataxis; wheelchari bound and averbal; hard of hearing and vision impaired  5. Chronic hypotension  6.  Hypertrophic

## 2019-12-18 NOTE — PAYOR COMM NOTE
--------------  DISCHARGE REVIEW    Payor: Mark Mar #:  [id number]  Authorization Number: [id number]    Admit date: 12/16/19  Admit time:  2305  Discharge Date: 12/17/2019  7:37 PM     Admitting Physician: Alka Woodson vomiting or diarrhea. Transferred here for further care. Brief Synopsis: pt ruled out for PNA. Pt evaluated by cardiology. Stopped abx and patient on RA.   Cardiology cleared for DC and pt discharged in good condition     Procedures during hospitalizat week    --------------------------------------------------------------------------------------  PATIENT DISCHARGE INSTRUCTIONS: See electronic chart    Pradip Fletcher MD 12/17/2019    Time spent:  > 30 minutes      Electronically signed by Massimo Salcedo,

## 2019-12-18 NOTE — DISCHARGE SUMMARY
[facility] [place] [facility]  DISCHARGE SUMMARY     Cindi Finch Patient Status:  Inpatient    5/3/1990 MRN [id number]   [facility] 8NE-A Attending Merry Russell MD   Hosp Day # 1 PCP Vannessa Chun MD     Date of Admission: 2019  Date (two) times daily. Quantity:  60 tablet  Refills:  2        CONTINUE taking these medications      Instructions Prescription details   baclofen 10 MG Tabs  Commonly known as:  LIORESAL      Take 20 mg by mouth nightly.    Refills:  2     digoxin 0.125 MG

## 2020-03-10 ENCOUNTER — APPOINTMENT (OUTPATIENT)
Dept: GENERAL RADIOLOGY | Facility: HOSPITAL | Age: 30
End: 2020-03-10
Payer: MEDICAID

## 2020-03-10 ENCOUNTER — HOSPITAL ENCOUNTER (OUTPATIENT)
Facility: HOSPITAL | Age: 30
Setting detail: OBSERVATION
Discharge: HOME | End: 2020-03-12
Attending: EMERGENCY MEDICINE | Admitting: HOSPITALIST
Payer: MEDICAID

## 2020-03-10 DIAGNOSIS — I50.9 ACUTE ON CHRONIC CONGESTIVE HEART FAILURE, UNSPECIFIED HEART FAILURE TYPE (HCC): ICD-10-CM

## 2020-03-10 DIAGNOSIS — R06.02 SHORTNESS OF BREATH: Primary | ICD-10-CM

## 2020-03-10 DIAGNOSIS — I48.20 ATRIAL FIBRILLATION, CHRONIC (HCC): ICD-10-CM

## 2020-03-10 LAB
ALBUMIN SERPL-MCNC: 4 G/DL (ref 3.4–5)
ALBUMIN/GLOB SERPL: 1 {RATIO} (ref 1–2)
ALP LIVER SERPL-CCNC: 47 U/L (ref 45–117)
ALT SERPL-CCNC: 53 U/L (ref 16–61)
ANION GAP SERPL CALC-SCNC: 4 MMOL/L (ref 0–18)
APTT PPP: 36.1 SECONDS (ref 25.4–36.1)
AST SERPL-CCNC: 39 U/L (ref 15–37)
BASOPHILS # BLD AUTO: 0.03 X10(3) UL (ref 0–0.2)
BASOPHILS NFR BLD AUTO: 0.4 %
BILIRUB SERPL-MCNC: 0.5 MG/DL (ref 0.1–2)
BUN BLD-MCNC: 13 MG/DL (ref 7–18)
BUN/CREAT SERPL: 11.1 (ref 10–20)
CALCIUM BLD-MCNC: 8.7 MG/DL (ref 8.5–10.1)
CHLORIDE SERPL-SCNC: 107 MMOL/L (ref 98–112)
CO2 SERPL-SCNC: 27 MMOL/L (ref 21–32)
CREAT BLD-MCNC: 1.17 MG/DL (ref 0.7–1.3)
DEPRECATED RDW RBC AUTO: 41 FL (ref 35.1–46.3)
DIGOXIN SERPL-MCNC: 1.86 NG/ML (ref 0.8–2)
EOSINOPHIL # BLD AUTO: 0.05 X10(3) UL (ref 0–0.7)
EOSINOPHIL NFR BLD AUTO: 0.7 %
ERYTHROCYTE [DISTWIDTH] IN BLOOD BY AUTOMATED COUNT: 12.5 % (ref 11–15)
GLOBULIN PLAS-MCNC: 4.1 G/DL (ref 2.8–4.4)
GLUCOSE BLD-MCNC: 87 MG/DL (ref 70–99)
HCT VFR BLD AUTO: 43.4 % (ref 39–53)
HGB BLD-MCNC: 14.6 G/DL (ref 13–17.5)
IMM GRANULOCYTES # BLD AUTO: 0.04 X10(3) UL (ref 0–1)
IMM GRANULOCYTES NFR BLD: 0.6 %
INR BLD: 1.43 (ref 0.9–1.1)
LYMPHOCYTES # BLD AUTO: 1.77 X10(3) UL (ref 1–4)
LYMPHOCYTES NFR BLD AUTO: 24.6 %
M PROTEIN MFR SERPL ELPH: 8.1 G/DL (ref 6.4–8.2)
MCH RBC QN AUTO: 30 PG (ref 26–34)
MCHC RBC AUTO-ENTMCNC: 33.6 G/DL (ref 31–37)
MCV RBC AUTO: 89.3 FL (ref 80–100)
MONOCYTES # BLD AUTO: 1.34 X10(3) UL (ref 0.1–1)
MONOCYTES NFR BLD AUTO: 18.6 %
NEUTROPHILS # BLD AUTO: 3.97 X10 (3) UL (ref 1.5–7.7)
NEUTROPHILS # BLD AUTO: 3.97 X10(3) UL (ref 1.5–7.7)
NEUTROPHILS NFR BLD AUTO: 55.1 %
NT-PROBNP SERPL-MCNC: 1158 PG/ML (ref ?–125)
NT-PROBNP SERPL-MCNC: 1306 PG/ML (ref ?–125)
OSMOLALITY SERPL CALC.SUM OF ELEC: 285 MOSM/KG (ref 275–295)
PLATELET # BLD AUTO: 222 10(3)UL (ref 150–450)
POTASSIUM SERPL-SCNC: 3.8 MMOL/L (ref 3.5–5.1)
PSA SERPL DL<=0.01 NG/ML-MCNC: 18.2 SECONDS (ref 12.5–14.7)
RBC # BLD AUTO: 4.86 X10(6)UL (ref 4.3–5.7)
SODIUM SERPL-SCNC: 138 MMOL/L (ref 136–145)
TROPONIN I SERPL-MCNC: 1.17 NG/ML (ref ?–0.04)
WBC # BLD AUTO: 7.2 X10(3) UL (ref 4–11)

## 2020-03-10 PROCEDURE — 99220 INITIAL OBSERVATION CARE,LEVL III: CPT | Performed by: HOSPITALIST

## 2020-03-10 PROCEDURE — 71045 X-RAY EXAM CHEST 1 VIEW: CPT

## 2020-03-10 RX ORDER — ACETAMINOPHEN 325 MG/1
650 TABLET ORAL EVERY 6 HOURS PRN
Status: DISCONTINUED | OUTPATIENT
Start: 2020-03-10 | End: 2020-03-12

## 2020-03-10 RX ORDER — DIGOXIN 250 MCG
250 TABLET ORAL DAILY
Status: DISCONTINUED | OUTPATIENT
Start: 2020-03-11 | End: 2020-03-12

## 2020-03-10 RX ORDER — FUROSEMIDE 10 MG/ML
20 INJECTION INTRAMUSCULAR; INTRAVENOUS ONCE
Status: COMPLETED | OUTPATIENT
Start: 2020-03-11 | End: 2020-03-11

## 2020-03-10 RX ORDER — PANTOPRAZOLE SODIUM 20 MG/1
20 TABLET, DELAYED RELEASE ORAL
Status: DISCONTINUED | OUTPATIENT
Start: 2020-03-11 | End: 2020-03-12

## 2020-03-10 RX ORDER — SPIRONOLACTONE 25 MG/1
12.5 TABLET ORAL DAILY
Status: DISCONTINUED | OUTPATIENT
Start: 2020-03-11 | End: 2020-03-12

## 2020-03-10 RX ORDER — ERGOCALCIFEROL 1.25 MG/1
1 CAPSULE ORAL
COMMUNITY

## 2020-03-10 RX ORDER — BACLOFEN 10 MG/1
20 TABLET ORAL NIGHTLY
Status: DISCONTINUED | OUTPATIENT
Start: 2020-03-10 | End: 2020-03-12

## 2020-03-10 RX ORDER — OMEPRAZOLE 20 MG/1
20 CAPSULE, DELAYED RELEASE ORAL DAILY PRN
COMMUNITY

## 2020-03-10 RX ORDER — LOSARTAN POTASSIUM 25 MG/1
25 TABLET ORAL NIGHTLY
Status: DISCONTINUED | OUTPATIENT
Start: 2020-03-10 | End: 2020-03-12

## 2020-03-10 RX ORDER — FUROSEMIDE 10 MG/ML
20 INJECTION INTRAMUSCULAR; INTRAVENOUS ONCE
Status: COMPLETED | OUTPATIENT
Start: 2020-03-10 | End: 2020-03-10

## 2020-03-10 RX ORDER — ONDANSETRON 2 MG/ML
4 INJECTION INTRAMUSCULAR; INTRAVENOUS EVERY 6 HOURS PRN
Status: DISCONTINUED | OUTPATIENT
Start: 2020-03-10 | End: 2020-03-12

## 2020-03-10 RX ORDER — ASPIRIN 81 MG/1
324 TABLET, CHEWABLE ORAL ONCE
Status: COMPLETED | OUTPATIENT
Start: 2020-03-10 | End: 2020-03-10

## 2020-03-10 RX ORDER — METOPROLOL SUCCINATE 100 MG/1
100 TABLET, EXTENDED RELEASE ORAL NIGHTLY
Status: DISCONTINUED | OUTPATIENT
Start: 2020-03-10 | End: 2020-03-12

## 2020-03-10 NOTE — ED INITIAL ASSESSMENT (HPI)
PT SEEN DR ROONEY LAST WEEK FOR ATRIAL FLUTTER AND PT WAS TO INCREASE MEDICATION. PT STARTED TO HAVE A COUGH SINCE SAT.  PT C/O INCREASE WOB AND CP

## 2020-03-11 ENCOUNTER — APPOINTMENT (OUTPATIENT)
Dept: CV DIAGNOSTICS | Facility: HOSPITAL | Age: 30
End: 2020-03-11
Attending: INTERNAL MEDICINE
Payer: MEDICAID

## 2020-03-11 LAB
ADENOVIRUS PCR:: NEGATIVE
ANION GAP SERPL CALC-SCNC: 6 MMOL/L (ref 0–18)
ATRIAL RATE: 102 BPM
B PERT DNA SPEC QL NAA+PROBE: NEGATIVE
BASOPHILS # BLD AUTO: 0.04 X10(3) UL (ref 0–0.2)
BASOPHILS NFR BLD AUTO: 0.7 %
BUN BLD-MCNC: 13 MG/DL (ref 7–18)
BUN/CREAT SERPL: 15.3 (ref 10–20)
C PNEUM DNA SPEC QL NAA+PROBE: NEGATIVE
CALCIUM BLD-MCNC: 8.4 MG/DL (ref 8.5–10.1)
CHLORIDE SERPL-SCNC: 107 MMOL/L (ref 98–112)
CHOLEST SMN-MCNC: 92 MG/DL (ref ?–200)
CO2 SERPL-SCNC: 26 MMOL/L (ref 21–32)
CORONAVIRUS 229E PCR:: NEGATIVE
CORONAVIRUS HKU1 PCR:: NEGATIVE
CORONAVIRUS NL63 PCR:: NEGATIVE
CORONAVIRUS OC43 PCR:: NEGATIVE
CREAT BLD-MCNC: 0.85 MG/DL (ref 0.7–1.3)
DEPRECATED RDW RBC AUTO: 40.1 FL (ref 35.1–46.3)
EOSINOPHIL # BLD AUTO: 0.07 X10(3) UL (ref 0–0.7)
EOSINOPHIL NFR BLD AUTO: 1.3 %
ERYTHROCYTE [DISTWIDTH] IN BLOOD BY AUTOMATED COUNT: 12.7 % (ref 11–15)
FLUAV RNA SPEC QL NAA+PROBE: NEGATIVE
FLUBV RNA SPEC QL NAA+PROBE: NEGATIVE
GLUCOSE BLD-MCNC: 87 MG/DL (ref 70–99)
HCT VFR BLD AUTO: 42.6 % (ref 39–53)
HDLC SERPL-MCNC: 25 MG/DL (ref 40–59)
HGB BLD-MCNC: 14.4 G/DL (ref 13–17.5)
IMM GRANULOCYTES # BLD AUTO: 0.04 X10(3) UL (ref 0–1)
IMM GRANULOCYTES NFR BLD: 0.7 %
LDLC SERPL CALC-MCNC: 53 MG/DL (ref ?–100)
LYMPHOCYTES # BLD AUTO: 1.71 X10(3) UL (ref 1–4)
LYMPHOCYTES NFR BLD AUTO: 30.7 %
MCH RBC QN AUTO: 29.6 PG (ref 26–34)
MCHC RBC AUTO-ENTMCNC: 33.8 G/DL (ref 31–37)
MCV RBC AUTO: 87.5 FL (ref 80–100)
METAPNEUMOVIRUS PCR:: NEGATIVE
MONOCYTES # BLD AUTO: 0.83 X10(3) UL (ref 0.1–1)
MONOCYTES NFR BLD AUTO: 14.9 %
MORPHOLOGY: NORMAL
MYCOPLASMA PNEUMONIA PCR:: NEGATIVE
NEUTROPHILS # BLD AUTO: 2.88 X10 (3) UL (ref 1.5–7.7)
NEUTROPHILS # BLD AUTO: 2.88 X10(3) UL (ref 1.5–7.7)
NEUTROPHILS NFR BLD AUTO: 51.7 %
NONHDLC SERPL-MCNC: 67 MG/DL (ref ?–130)
OSMOLALITY SERPL CALC.SUM OF ELEC: 287 MOSM/KG (ref 275–295)
P AXIS: 118 DEGREES
P-R INTERVAL: 128 MS
PARAINFLUENZA 1 PCR:: NEGATIVE
PARAINFLUENZA 2 PCR:: NEGATIVE
PARAINFLUENZA 3 PCR:: NEGATIVE
PARAINFLUENZA 4 PCR:: NEGATIVE
PLATELET # BLD AUTO: 207 10(3)UL (ref 150–450)
POTASSIUM SERPL-SCNC: 3.3 MMOL/L (ref 3.5–5.1)
Q-T INTERVAL: 304 MS
QRS DURATION: 76 MS
QTC CALCULATION (BEZET): 396 MS
R AXIS: 185 DEGREES
RBC # BLD AUTO: 4.87 X10(6)UL (ref 4.3–5.7)
RHINOVIRUS/ENTERO PCR:: NEGATIVE
RSV RNA SPEC QL NAA+PROBE: NEGATIVE
SODIUM SERPL-SCNC: 139 MMOL/L (ref 136–145)
T AXIS: -11 DEGREES
TRIGL SERPL-MCNC: 70 MG/DL (ref 30–149)
TROPONIN I SERPL-MCNC: 1.17 NG/ML (ref ?–0.04)
VENTRICULAR RATE: 102 BPM
VLDLC SERPL CALC-MCNC: 14 MG/DL (ref 0–30)
WBC # BLD AUTO: 5.6 X10(3) UL (ref 4–11)

## 2020-03-11 PROCEDURE — 93306 TTE W/DOPPLER COMPLETE: CPT | Performed by: INTERNAL MEDICINE

## 2020-03-11 PROCEDURE — 99225 SUBSEQUENT OBSERVATION CARE: CPT | Performed by: INTERNAL MEDICINE

## 2020-03-11 RX ORDER — POTASSIUM CHLORIDE 20 MEQ/1
40 TABLET, EXTENDED RELEASE ORAL ONCE
Status: COMPLETED | OUTPATIENT
Start: 2020-03-11 | End: 2020-03-11

## 2020-03-11 RX ORDER — FUROSEMIDE 10 MG/ML
20 INJECTION INTRAMUSCULAR; INTRAVENOUS ONCE
Status: DISCONTINUED | OUTPATIENT
Start: 2020-03-11 | End: 2020-03-12

## 2020-03-11 NOTE — DIETARY NOTE
Nutrition Short Note    Dietitian consult received for heart failure education. Appropriate education and handout provided. All questions answered. RD available PRN.     Flip Marion MS, RD, LDN

## 2020-03-11 NOTE — ED PROVIDER NOTES
Patient Seen in: [facility] Emergency Department      History   Patient presents with:  Chest Pain Angina  Dyspnea BRIAN SOB    Stated Complaint:     HPI    Patient is a 45-year-old male who has a history of Friedreich's ataxia.   Patient not able to p Pulse 03/10/20 1631 106   Resp 03/10/20 1631 20   Temp 03/10/20 1633 98 °F (36.7 °C)   Temp src 03/10/20 1631 Temporal   SpO2 03/10/20 1631 98 %   O2 Device 03/10/20 1631 None (Room air)       Current:/83   Pulse 98   Temp 98 °F (36.7 °C)   Resp 20 PLATELET.   Procedure                               Abnormality         Status                     ---------                               -----------         ------                     CBC W/ DIFFERENTIAL[[id number]]          Abnormal            Final resul

## 2020-03-11 NOTE — OCCUPATIONAL THERAPY NOTE
Received order for OT evaluation. Per chart review and previous therapy notes, patient performs ADL with max to total A. Uses shahram lift for transfers. OT will sign off. Spoke with PT who contacted RN.

## 2020-03-11 NOTE — CONSULTS
[facility]  Cardiology Consultation    Sonu Cadet Patient Status:  Observation    5/3/1990 MRN [id number]   [facility] 7NE-A Attending Ernesto Swartz MD   Hosp Day # 0 PCP Ernestnia Harmon MD     Reason for Consultation if not otherwise mention in above HPI.     /83   Pulse 98   Temp 98 °F (36.7 °C)   Resp 20   Wt 135 lb (61.2 kg)   SpO2 100%   BMI 20.53 kg/m²   Temp (24hrs), Av °F (36.7 °C), Min:98 °F (36.7 °C), Max:98 °F (36.7 °C)       Intake/Output Summary (L Vasquez Morrison MD on 3/10/2020 at 4:53 PM       Impression:  · Acute on chronic systolic heart failure - Branden's ataxia with CM and last LVEF 15% on ALONSO July 2019  · Atrial fibrillation and flutter - hx Afib/flutter s/p ablation 7/2019    Recommendati

## 2020-03-11 NOTE — PROGRESS NOTES
NURSING ADMISSION NOTE      Patient admitted via cart. Oriented to room. Safety precautions initiated. Bed in low position. Call light in reach. Pt admitted from ED to room 7080. Pt presents to the floor in no apparent distress.   Denies any cp or

## 2020-03-11 NOTE — PHYSICAL THERAPY NOTE
PT eval order noted, spoke with RN who confirmed that patient has Friedreich's ataxia and is bed/wheelchair bound and shahram lift at baseline for transfers. No skilled PT need, RN aware and notified.

## 2020-03-11 NOTE — PROGRESS NOTES
[facility]  Progress Note    Mireille Cho Patient Status:  Observation    5/3/1990 MRN [id number]   [facility] 7NE-A Attending Renea Cooper MD   Hosp Day # 0 PCP Arloa Osgood, MD     CC: Shortness of breath    SUBJECTIVE:  Sh 03/11/2020    K 3.3 03/11/2020     03/11/2020    CO2 26.0 03/11/2020    GLU 87 03/11/2020    CA 8.4 03/11/2020    ALB 4.0 03/10/2020    ALKPHO 47 03/10/2020    BILT 0.5 03/10/2020    TP 8.1 03/10/2020    AST 39 03/10/2020    ALT 53 03/10/2020    PTT Q6H PRN  ondansetron HCl (ZOFRAN) injection 4 mg, 4 mg, Intravenous, Q6H PRN        ASSESSMENT / PLAN:     1. Atrial flutter, atrial fibrillation with rapid ventricular rate  1. Seen by cardiology  2.  Patient on IV amiodarone drip and rate control at this

## 2020-03-11 NOTE — PROGRESS NOTES
[facility]    Electrophysiology Follow Up Note      David Lal Patient Status:  Observation    5/3/1990 MRN [id number]   [facility] 7NE-A Attending Abelardo Pittman MD   Hosp Day # 0 PCP Carlos Sanders MD     [facility] tablet 12.5 mg, 12.5 mg, Oral, Daily  •  baclofen (LIORESAL) tab 20 mg, 20 mg, Oral, Nightly  •  Pantoprazole Sodium (PROTONIX) EC tab 20 mg, 20 mg, Oral, QAM AC  •  acetaminophen (TYLENOL) tab 650 mg, 650 mg, Oral, Q6H PRN  •  ondansetron HCl (ZOFRAN) inj decompensated HFrEF caused elevated HR. Regardless, in clinic I have been suggesting amiodarone for rhythm control of paroxysmal atypical atrial flutter and atrial fibrillation.     1) paroxysmal atypical atrial flutter and atrial fibrillation  - recurred d

## 2020-03-11 NOTE — H&P
[facility]  History and Physical     Ginny Maradiaga Patient Status:  Observation    5/3/1990 MRN [id number]   [facility] 7NE-A Attending Kenji Luna MD   Hosp Day # 0 PCP Devon Romero MD     Chief Complaint: SOB    Histo tablet by mouth once a week., Disp: , Rfl:   omeprazole 20 MG Oral Capsule Delayed Release, Take 20 mg by mouth daily as needed. , Disp: , Rfl:   apixaban 5 MG Oral Tab, Take 1 tablet (5 mg total) by mouth 2 (two) times daily. , Disp: 60 tablet, Rfl: 2  Losa 3.8      CO2 27.0   ALKPHO 47   AST 39*   ALT 53   BILT 0.5   TP 8.1       Estimated Creatinine Clearance: 81.4 mL/min (based on SCr of 1.17 mg/dL).     Recent Labs   Lab 03/10/20  1636   PTP 18.2*   INR 1.43*       Recent Labs   Lab 03/10/20  1636

## 2020-03-12 VITALS
OXYGEN SATURATION: 95 % | WEIGHT: 133.38 LBS | BODY MASS INDEX: 20 KG/M2 | SYSTOLIC BLOOD PRESSURE: 95 MMHG | HEART RATE: 90 BPM | RESPIRATION RATE: 15 BRPM | TEMPERATURE: 98 F | DIASTOLIC BLOOD PRESSURE: 63 MMHG

## 2020-03-12 LAB
ANION GAP SERPL CALC-SCNC: 6 MMOL/L (ref 0–18)
BUN BLD-MCNC: 17 MG/DL (ref 7–18)
BUN/CREAT SERPL: 18.1 (ref 10–20)
CALCIUM BLD-MCNC: 8.7 MG/DL (ref 8.5–10.1)
CHLORIDE SERPL-SCNC: 107 MMOL/L (ref 98–112)
CO2 SERPL-SCNC: 24 MMOL/L (ref 21–32)
CREAT BLD-MCNC: 0.94 MG/DL (ref 0.7–1.3)
GLUCOSE BLD-MCNC: 103 MG/DL (ref 70–99)
HAV IGM SER QL: 2.1 MG/DL (ref 1.6–2.6)
OSMOLALITY SERPL CALC.SUM OF ELEC: 286 MOSM/KG (ref 275–295)
POTASSIUM SERPL-SCNC: 3.9 MMOL/L (ref 3.5–5.1)
SODIUM SERPL-SCNC: 137 MMOL/L (ref 136–145)

## 2020-03-12 PROCEDURE — 99217 OBSERVATION CARE DISCHARGE: CPT | Performed by: INTERNAL MEDICINE

## 2020-03-12 RX ORDER — DIGOXIN 250 MCG
250 TABLET ORAL DAILY
Qty: 30 TABLET | Refills: 3 | Status: SHIPPED | OUTPATIENT
Start: 2020-03-13 | End: 2021-12-13

## 2020-03-12 RX ORDER — AMIODARONE HYDROCHLORIDE 400 MG/1
400 TABLET ORAL 2 TIMES DAILY WITH MEALS
Qty: 14 TABLET | Refills: 0 | Status: SHIPPED | OUTPATIENT
Start: 2020-03-12 | End: 2020-03-19

## 2020-03-12 RX ORDER — FUROSEMIDE 10 MG/ML
20 INJECTION INTRAMUSCULAR; INTRAVENOUS ONCE
Status: COMPLETED | OUTPATIENT
Start: 2020-03-12 | End: 2020-03-12

## 2020-03-12 RX ORDER — AMIODARONE HYDROCHLORIDE 200 MG/1
200 TABLET ORAL DAILY
Status: DISCONTINUED | OUTPATIENT
Start: 2020-03-19 | End: 2020-03-12

## 2020-03-12 RX ORDER — LOPERAMIDE HYDROCHLORIDE 2 MG/1
2 CAPSULE ORAL ONCE
Status: DISCONTINUED | OUTPATIENT
Start: 2020-03-12 | End: 2020-03-12

## 2020-03-12 RX ORDER — AMIODARONE HYDROCHLORIDE 200 MG/1
400 TABLET ORAL 2 TIMES DAILY WITH MEALS
Status: DISCONTINUED | OUTPATIENT
Start: 2020-03-12 | End: 2020-03-12

## 2020-03-12 RX ORDER — AMIODARONE HYDROCHLORIDE 200 MG/1
200 TABLET ORAL DAILY
Qty: 30 TABLET | Refills: 3 | Status: SHIPPED | OUTPATIENT
Start: 2020-03-19 | End: 2021-12-13 | Stop reason: CLARIF

## 2020-03-12 NOTE — CM/SW NOTE
Pt has a history of Friedreich ataxia and is wheelchair bound. Pt lives with his parents and has an employed caregiver at home 3x/wk. Pt does not have any d/c needs at this time.   / to remain available for support and/or discharg

## 2020-03-12 NOTE — DIETARY NOTE
[address]     Admitting diagnosis:  Shortness of breath [R06.02]  Atrial fibrillation, chronic ([facility] [I48.20]  Acute on chronic congestive heart failure, unspecified heart failure type ([facility] [I50.9]    Ht:  5'8

## 2020-03-12 NOTE — PROGRESS NOTES
[facility]    Electrophysiology Follow Up Note      Harris Ku Patient Status:  Observation    5/3/1990 MRN [id number]   [facility] 7NE-A Attending Jason Mace MD   Hosp Day # 0 PCP MD Kp [place] (60.5 kg)  12/17/19 : 144 lb (65.3 kg)  08/02/19 : 148 lb 2.4 oz (67.2 kg)      Did not examen him as I did not want to wake him or his mother.       Labs:     Recent Labs   Lab 03/10/20  1636 03/11/20  0813 03/12/20  0610   GLU 87 87 103*   BUN 13 13 17 ([facility]     Upper respiratory tract infection, unspecified type     Elevated troponin     Cardiomyopathy (HCC)     TIA (obstructive sleep apnea)     Parainfluenza     WILLIAM (acute kidney injury) ([facility]     Elevated LFTs     Ileus (HCC)     Pneumonia     Viral b

## 2020-03-12 NOTE — CONSULTS
[facility]  Pulmonary/Critical Care Consult Note  [facility]  Report of Consultation    Han Mcintyre Patient Status:  Observation    5/3/1990 MRN [id number]   [facility] 7NE-A Attending Jeovany Blandon Heart Disorder Maternal Grandmother    • Hypertension Maternal Grandmother       reports that he has never smoked. He has never used smokeless tobacco. He reports that he does not drink alcohol or use drugs.     Allergies:  No Known Allergies    Medications the past 24 hrs:   BP Temp Temp src Pulse Resp SpO2 Weight   03/12/20 1027 — — — 86 — 100 % —   03/12/20 1026 — — — 88 — — —   03/12/20 1000 106/75 97.8 °F (36.6 °C) Oral 94 14 93 % —   03/12/20 0540 96/54 97.7 °F (36.5 °C) Axillary 71 14 100 % 133 lb 6.1 33.8   RDW 12.5 12.7   NEPRELIM 3.97 2.88   WBC 7.2 5.6   .0 207.0     No results for input(s): BNP in the last 168 hours.   Recent Labs   Lab 03/10/20  1636 03/11/20  0813   TROP 1.170* 1.170*     Recent Labs   Lab 03/10/20  1636   INR 1.43*   PTT 3 Will follow with you.     Barbara Torrez MD  [facility]

## 2020-03-12 NOTE — PLAN OF CARE
Assumed care 1900. Mother at bedside. 2L O2 over night to maintain O2 saturation. Aflutter/afib on tele. Amiodarone gtt decreased to 0.5gtt/min per order. IV lasix and evening BP medications held per order. Pt voiding per urinal.   Repositioned.

## 2020-03-12 NOTE — PLAN OF CARE
Assumed care at 0700. A-fib/a-flutter on tele, RA/2L for comfort. No c/o SOB. Amio bolus given and gtt started. SBP lower this evening. Per Basim SOSA, continue amio gtt but hold IV lasix and BP meds. Up to commode with shahram lift.  Pt's mom at bedside throug

## 2020-03-13 NOTE — PROGRESS NOTES
[facility]  Progress Note    Maciel Gonzalez Patient Status:  Observation    5/3/1990 MRN [id number]   [facility] 7NE-A Attending Stanley Fleischer, MD   Hosp Day # 0 PCP Husam Garcia MD     CC: Shortness of breath    SUBJECTIVE:  Sh 0.94 03/12/2020    BUN 17 03/12/2020     03/12/2020    K 3.9 03/12/2020     03/12/2020    CO2 24.0 03/12/2020     03/12/2020    CA 8.7 03/12/2020    MG 2.1 03/12/2020       Imaging:   XR CHEST AP PORTABLE  (CPT=71045)     TECHNIQUE:  AP cardiology  3. Patient on Eliquis and digoxin per cardiology  2. Acute on chronic systolic heart failure, cardiomyopathy  1. Cardiology following  2. Status post IV Lasix yesterday and today  3. 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  L

## 2020-03-13 NOTE — DISCHARGE SUMMARY
[facility]  Discharge Summary    Harris Ku Patient Status:  Observation    5/3/1990 MRN [id number]   [facility] 7NE-A Attending Jason Mace MD   Hosp Day # 0 PCP Carl Tamayo MD     Date of Admission: 3/10/2020    Date o vomiting. Patient seen by pulmonary per patient's mom's request for obstructive sleep apnea on trilogy at home and follows up with [facility] Dr. Jayda Mccormick and for discussion on risk of pulmonary fibrosis with amiodarone.   Chest x-ray done on admission show taking on:  March 13, 2020  What changed:  medication strength      Take 1 tablet (250 mcg total) by mouth daily.    Quantity:  30 tablet  Refills:  3        CONTINUE taking these medications      Instructions Prescription details   apixaban 5 MG Tabs  [address]  [id number]  [phone]      Follow-up as arranged    Appointments for Next 30 Days 3/12/2020 - 4/11/2020    None            Physical Exam:  BP 95/63 (BP Location: Right arm)   Pulse 90   Temp 98 °F (36.7 °C) (Oral)   Resp 15   W

## 2020-03-13 NOTE — PLAN OF CARE
NURSING DISCHARGE NOTE    Discharged Home via Wheelchair. Accompanied by Family member  Belongings Taken by patient/family. Cleared for discharge by all consults. Discharge AVS completed and reviewed with patient's mom and dad.  Discussed discharge to facilitate oxygenation and minimize respiratory effort  - Oxygen supplementation based on oxygen saturation or ABGs  - Provide Smoking Cessation handout, if applicable  - Encourage broncho-pulmonary hygiene including cough, deep breathe, Incentive Glenda Samples activity level and precautions  Outcome: Adequate for Discharge     Problem: Impaired Activities of Daily Living  Goal: Achieve highest/safest level of independence in self care  Description  Interventions:  - Assess ability and encourage patient to partic

## 2020-03-14 LAB
ATRIAL RATE: 83 BPM
P AXIS: 49 DEGREES
P-R INTERVAL: 140 MS
Q-T INTERVAL: 388 MS
QRS DURATION: 72 MS
QTC CALCULATION (BEZET): 455 MS
R AXIS: 187 DEGREES
T AXIS: 15 DEGREES
VENTRICULAR RATE: 83 BPM

## 2020-08-13 ENCOUNTER — HOSPITAL ENCOUNTER (OUTPATIENT)
Dept: ULTRASOUND IMAGING | Facility: HOSPITAL | Age: 30
Discharge: HOME | End: 2020-08-13
Attending: INTERNAL MEDICINE
Payer: MEDICAID

## 2020-08-13 DIAGNOSIS — R18.8 ASCITES: ICD-10-CM

## 2020-08-13 PROCEDURE — 76705 ECHO EXAM OF ABDOMEN: CPT | Performed by: INTERNAL MEDICINE

## 2021-12-13 ENCOUNTER — HOSPITAL ENCOUNTER (INPATIENT)
Facility: HOSPITAL | Age: 31
LOS: 10 days | Discharge: HOME | DRG: 314 | End: 2021-12-23
Attending: EMERGENCY MEDICINE | Admitting: HOSPITALIST
Payer: MEDICAID

## 2021-12-13 ENCOUNTER — APPOINTMENT (OUTPATIENT)
Dept: GENERAL RADIOLOGY | Facility: HOSPITAL | Age: 31
DRG: 314 | End: 2021-12-13
Attending: EMERGENCY MEDICINE
Payer: MEDICAID

## 2021-12-13 DIAGNOSIS — R09.02 HYPOXEMIA: ICD-10-CM

## 2021-12-13 DIAGNOSIS — R06.02 SHORTNESS OF BREATH: Primary | ICD-10-CM

## 2021-12-13 DIAGNOSIS — N17.9 AKI (ACUTE KIDNEY INJURY) (HCC): ICD-10-CM

## 2021-12-13 DIAGNOSIS — I50.9 CHRONIC CONGESTIVE HEART FAILURE, UNSPECIFIED HEART FAILURE TYPE (HCC): ICD-10-CM

## 2021-12-13 DIAGNOSIS — I49.9 CARDIAC ARRHYTHMIA, UNSPECIFIED CARDIAC ARRHYTHMIA TYPE: ICD-10-CM

## 2021-12-13 PROCEDURE — 71045 X-RAY EXAM CHEST 1 VIEW: CPT | Performed by: EMERGENCY MEDICINE

## 2021-12-13 PROCEDURE — 99223 1ST HOSP IP/OBS HIGH 75: CPT | Performed by: HOSPITALIST

## 2021-12-13 RX ORDER — PANTOPRAZOLE SODIUM 20 MG/1
20 TABLET, DELAYED RELEASE ORAL
Status: DISCONTINUED | OUTPATIENT
Start: 2021-12-14 | End: 2021-12-23

## 2021-12-13 RX ORDER — SODIUM PHOSPHATE, DIBASIC AND SODIUM PHOSPHATE, MONOBASIC 7; 19 G/133ML; G/133ML
1 ENEMA RECTAL ONCE AS NEEDED
Status: DISCONTINUED | OUTPATIENT
Start: 2021-12-13 | End: 2021-12-23

## 2021-12-13 RX ORDER — BISACODYL 10 MG
10 SUPPOSITORY, RECTAL RECTAL
Status: DISCONTINUED | OUTPATIENT
Start: 2021-12-13 | End: 2021-12-23

## 2021-12-13 RX ORDER — MELATONIN
3 NIGHTLY PRN
Status: DISCONTINUED | OUTPATIENT
Start: 2021-12-13 | End: 2021-12-23

## 2021-12-13 RX ORDER — AMIODARONE HYDROCHLORIDE 100 MG/1
100 TABLET ORAL DAILY
COMMUNITY
End: 2021-12-23

## 2021-12-13 RX ORDER — ONDANSETRON 2 MG/ML
4 INJECTION INTRAMUSCULAR; INTRAVENOUS EVERY 6 HOURS PRN
Status: DISCONTINUED | OUTPATIENT
Start: 2021-12-13 | End: 2021-12-23

## 2021-12-13 RX ORDER — SENNOSIDES 8.6 MG
17.2 TABLET ORAL NIGHTLY PRN
Status: DISCONTINUED | OUTPATIENT
Start: 2021-12-13 | End: 2021-12-23

## 2021-12-13 RX ORDER — ACETAMINOPHEN 325 MG/1
650 TABLET ORAL EVERY 6 HOURS PRN
Status: DISCONTINUED | OUTPATIENT
Start: 2021-12-13 | End: 2021-12-23

## 2021-12-13 RX ORDER — BACLOFEN 20 MG/1
20 TABLET ORAL NIGHTLY
Status: DISCONTINUED | OUTPATIENT
Start: 2021-12-13 | End: 2021-12-23

## 2021-12-13 RX ORDER — FUROSEMIDE 10 MG/ML
20 INJECTION INTRAMUSCULAR; INTRAVENOUS ONCE
Status: COMPLETED | OUTPATIENT
Start: 2021-12-13 | End: 2021-12-13

## 2021-12-13 RX ORDER — MIDAZOLAM HYDROCHLORIDE 1 MG/ML
INJECTION INTRAMUSCULAR; INTRAVENOUS
Status: DISPENSED
Start: 2021-12-13 | End: 2021-12-14

## 2021-12-13 RX ORDER — POLYETHYLENE GLYCOL 3350 17 G/17G
17 POWDER, FOR SOLUTION ORAL DAILY PRN
Status: DISCONTINUED | OUTPATIENT
Start: 2021-12-13 | End: 2021-12-23

## 2021-12-13 NOTE — H&P
ANIBALDenio HOSPITALIST  History and Physical     Paz Kay Patient Status:  Emergency    5/3/1990 MRN [id number]   Location 6[facility] Attending Dennis Rodrigues MD   Hosp Day # 0 PCP Chayito Mcghee MD     Chief Complaint Tab, Take 1 tablet (200 mg total) by mouth daily. (Patient taking differently: Take 100 mg by mouth daily.), Disp: 30 tablet, Rfl: 3  Multiple Vitamins-Minerals (MULTIVITAL) Oral Tab, Take 1 tablet by mouth daily. , Disp: , Rfl:   Coenzyme Q10 (CO Q 10 OR), K 4.0      CO2 25.0   ALKPHO 42*   AST 29   ALT 34   BILT 0.7   TP 7.8     No results for input(s): PTP, INR in the last 168 hours. No results for input(s): TROP, CK in the last 168 hours. Imaging: Imaging data reviewed in [facility].   ASSESSMENT / Mattie Lemus

## 2021-12-13 NOTE — RESPIRATORY THERAPY NOTE
Pt using his own trilogy machine for sleep per mother's request. Mother aware she is responsible for set up and maintenance of pt machine.

## 2021-12-13 NOTE — ED QUICK NOTES
Mom prefers to keep patient in wheelchair until he gets to a room, makes it easier to move him once, per mom.

## 2021-12-13 NOTE — ED QUICK NOTES
Orders for admission, patient is aware of plan and ready to go upstairs. Any questions, please call ED CATHI Carrillo  at extension [phone]. Vaccinated? yes  Type of COVID test sent:rapid  COVID Suspicion level: Low/High, low      Titratable drug(s) infusing:no

## 2021-12-13 NOTE — ED QUICK NOTES
Mom prefer patient stay in his wheelchair, she states he usually stays in it when he comes to the hospital.

## 2021-12-13 NOTE — HISTORICAL OFFICE NOTE
[facility] [address], 4th floor, [place], [address]  [phone]      Cookie Pimple  Progress Note  Demographics:  Name: Izzy Orourke YOB: 1990  Age: 28, Male Medic Stroke  Social History  Smoking status Never smoked  Tobacco usage - No (Non-smoker for personal reasons (finding))  Review of systems  Cardiovascular No history of Chest pain, ESCOBEDO, Palpitations, Syncope, PND, Orthopnea, Edema and Claudication  Physical Ex glucose  6. 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 Heart failure, systolic  9. Neurogenic muscular atrophy  10. 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 Decreased hearing of both ears  13. Hand cramps  14. TIA (obstruct MD  12/08/2021 08:18:32 AM  Disclaimer: Components of this note were documented using voice recognition system and are subject to errors not corrected at proofreading. Contact the author of this note for any clarifications.

## 2021-12-13 NOTE — ED INITIAL ASSESSMENT (HPI)
Per mom, pt had covid booster on Wednesday and has been desatting, pale, coughing. Pt mom put his trilogy on him and he is much better. Pt mom states that heart rate has been fluctuating. Pt wheelchair bound.

## 2021-12-13 NOTE — ED PROVIDER NOTES
Patient Seen in: [facility] Emergency Department      History   Patient presents with:  Difficulty Breathing    Stated Complaint: got booster on Wednesday, having episodes of hypoxia at home, per mom sats in 7*    Subjective:   HPI    28year-old ma 98.6 °F (37 °C)   Temp src    SpO2 99 %   O2 Device None (Room air)       Current:BP 97/70   Pulse 79   Temp 98.6 °F (37 °C)   Resp 19   Wt 61.2 kg   SpO2 97%   BMI 20.53 kg/m²         Physical Exam    General:  Vitals as listed.   No acute distress   HEENT was obtained.   COMPARISON:  [facility] , XR, XR CHEST AP PORTABLE  (CPT=71045), 7/24/2019, 8:47 AM.  [facility] , XR, XR CHEST AP PORTABLE  (CPT=71045), 7/26/2019, 4:22 AM.  [facility] , XR, XR CHEST AP PORTABLE  (CPT=71045), 3/10/2020, 4:40 PM.  INDICATIONS:  got olivo arrhythmia, unspecified cardiac arrhythmia type     Disposition:  Admit  12/13/2021 12:25 pm    Follow-up:  No follow-up provider specified.         Medications Prescribed:  Current Discharge Medication List                          Hospital Problems

## 2021-12-13 NOTE — CONSULTS
[facility]  Report of Consultation    [facility] Patient Status:  Inpatient    5/3/1990 MRN [id number]   [facility] 2NE-A Attending Kylah Carrillo MD   Hosp Day # 0 PCP Charla Roe MD     Reason for Consultation:  Obdulia Beltre • Atrial fibrillation (HCC)    • Congestive heart disease (HCC)    • Depression    • Friedreich ataxia (HCC)    • Hearing impairment    • Incontinence    • Low vision    • Muscle weakness    • Problems with swallowing    • Scoliosis    • Sleep apnea face: Denies any sore throat difficulty swallowing liquids as above  Respiratory: Cough and dyspnea appear resolved  Cardiovascular: Unaware when his heart rate is fast  Gastrointestinal: No known reflux or heartburn  Musculoskeletal: No lower extremity ed Value Date    WBC 9.1 12/13/2021    HGB 16.4 12/13/2021    HCT 48.4 12/13/2021    .0 12/13/2021    CREATSERUM 1.26 12/13/2021    BUN 18 12/13/2021     12/13/2021    K 4.0 12/13/2021     12/13/2021    CO2 25.0 12/13/2021     12/13/ with pulmonary edema orthopnea hypoxia--responding well to IV Lasix  · Longstanding history of atrial arrhythmias A. fib/flutter status post ablation ongoing medication adjustments recent increase in amiodarone-remains on Eliquis  · Elevated troponin cardi

## 2021-12-13 NOTE — CONSULTS
Edward-Jovita  Consultation    [facility] Patient Status:  Emergency    5/3/1990 MRN [id number]   Location 656 [facility] Attending Chato Reyes MD   Hosp Day # 0 PCP Charla Roe MD     Consults    Reason for Cons temperature 98.6 °F (37 °C), resp. rate 18, weight 135 lb (61.2 kg), SpO2 94 %.   Temp (24hrs), Av.6 °F (37 °C), Min:98.6 °F (37 °C), Max:98.6 °F (37 °C)    Wt Readings from Last 3 Encounters:  21 : 135 lb (61.2 kg)  20 : 133 lb 6.1 oz (60.5

## 2021-12-14 ENCOUNTER — APPOINTMENT (OUTPATIENT)
Dept: CV DIAGNOSTICS | Facility: HOSPITAL | Age: 31
DRG: 314 | End: 2021-12-14
Attending: HOSPITALIST
Payer: MEDICAID

## 2021-12-14 PROCEDURE — 02HV33Z INSERTION OF INFUSION DEVICE INTO SUPERIOR VENA CAVA, PERCUTANEOUS APPROACH: ICD-10-PCS | Performed by: INTERNAL MEDICINE

## 2021-12-14 PROCEDURE — 99233 SBSQ HOSP IP/OBS HIGH 50: CPT | Performed by: HOSPITALIST

## 2021-12-14 PROCEDURE — 93306 TTE W/DOPPLER COMPLETE: CPT | Performed by: HOSPITALIST

## 2021-12-14 RX ORDER — LIDOCAINE HYDROCHLORIDE 10 MG/ML
5 INJECTION, SOLUTION EPIDURAL; INFILTRATION; INTRACAUDAL; PERINEURAL ONCE
Status: DISCONTINUED | OUTPATIENT
Start: 2021-12-14 | End: 2021-12-23

## 2021-12-14 RX ORDER — POTASSIUM CHLORIDE 20 MEQ/1
40 TABLET, EXTENDED RELEASE ORAL EVERY 4 HOURS
Status: COMPLETED | OUTPATIENT
Start: 2021-12-14 | End: 2021-12-14

## 2021-12-14 NOTE — PROGRESS NOTES
Patient on jardiance at home - last Hba1c of 5.8 on 8/19/2021 done at [facility]. Will place on Novolog SSI as needed    Jonathan Mcguire M.D.   [place]

## 2021-12-14 NOTE — HISTORICAL OFFICE NOTE
[address] [facility] 12, 4th floor, [place], [address] [facility]  [phone]      Julian Rodriguez    Progress Note  Demographics:  Name: Julian Rodriguez YOB: 1990  Age: 28, Male Medical Record No Stroke    Social History    Smoking status Never smoked  Tobacco usage - No (Non-smoker for personal reasons (finding))    Review of systems  Cardiovascular No history of Chest pain, ESCOBEDO, Palpitations, Syncope, PND, Orthopnea, Edema and Claudication    Phy glucose  6. 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 Heart failure, systolic  9. Neurogenic muscular atrophy  10. 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 Decreased hearing of both ears  13. Hand cramps  14. TIA (obstruct Sulma Sabillon MD  12/08/2021 08:18:32 AM  Disclaimer: Components of this note were documented using voice recognition system and are subject to errors not corrected at proofreading. Contact the author of this note for any clarifications.

## 2021-12-14 NOTE — RESPIRATORY THERAPY NOTE
Pt with home trilogy at bedside with Veronica Fountain MD approval settings rr auto vt 400 ps min 3 max 6 epap 4 max press 10

## 2021-12-14 NOTE — PROGRESS NOTES
[facility]     Hospitalist Progress Note     Latisha Navarrete Patient Status:  Inpatient    5/3/1990 MRN [id number]   [facility] 6NE-A Attending Jorge Irvin MD   Hosp Day # 1 PCP Catalina Cadet MD     Chief Complaint: hypoxia fa 12/13/21  1008   PBNP 1,521*       Creatinine Kinase  No results for input(s): CK in the last 168 hours. Inflammatory Markers  No results for input(s): CRP, CLAYTON, LDH, DDIMER in the last 168 hours. Imaging: Imaging data reviewed in Epic.     Medication discharge:  tbd  Discharge is dependent on:  Controlled HR  At this point Mr. Herrera is expected to be discharge to: home

## 2021-12-14 NOTE — PROGRESS NOTES
[facility]  Progress Note    Brittany Zapata Patient Status:  Inpatient    5/3/1990 MRN [id number]   [facility] 6NE-A Attending Hardik Babb MD   Hosp Day # 1 PCP Tien Yepez MD     Subjective:  Brittany Zapata is a(n) 31 yea AP PORTABLE  (CPT=71045)    Result Date: 12/13/2021  CONCLUSION:  No acute cardiopulmonary pathology. Chronic gaseous distention of colon.     Dictated by (CST): Kobi Montoya MD on 12/13/2021 at 10:35 AM     Finalized by (CST): Kobi Montoya MD on 12/13/2 continue present management  · Continuing to follow    Krysta Calderon MD  12/14/2021  35min  8:50 AM

## 2021-12-14 NOTE — PLAN OF CARE
Assumed care of pt  @0932. Rec'd pt in bed, resting. Pt. Able to communicate with mother for needs. No movement to all 4 extremities noted, pt. Wheelchair bound. VSS, afebrile. Tele=NSR, no ectopy noted. Amiodarone drip noted.   Pt. Stable, in no acut

## 2021-12-14 NOTE — PHYSICAL THERAPY NOTE
Physical therapy consult requested via functional mobility screening. Spoke with pt and his mother, [address], at bedside. Pt is shahram lift and wheelchair bound at baseline.   [address] reports no concerns regarding mobility at this time or in regards to discharge

## 2021-12-14 NOTE — PAYOR COMM NOTE
--------------  ADMISSION REVIEW     Payor: Mark Mar #:  [id number]  Authorization Number: [id number]    Admit date: 12/13/21  Admit time:  4:22 PM       REVIEW DOCUMENTATION:     ED Provider Notes      ED Provi Smoking status: Never Smoker      Smokeless tobacco: Never Used    Alcohol use: Never    Drug use: Never             Review of Systems    Positive for stated complaint: got booster on Wednesday, having episodes of hypoxia at home, per mom sats in 7*  Ot Status                     ---------                               -----------         ------                     CBC W/ DIFFERENTIAL[[id number]]                              Final result                 Please view results for these tests on the individ tachyarrhythmias as well as significant pauses in the past on Holter monitor. Unclear etiology of his episodes of hypoxemia. CHF versus arrhythmia versus other. Discussed with cardiology as well as pulmonary medicine who will follow.   Admitted to the Ed when well cared for by the parents. They noticed the patient becoming pale from the neck up. He has had intermittent episodes of very fast heart rates. He received his booster for Covid few days ago and had a low-grade fever after.   There is no weight g (5 mg total) by mouth 2 (two) times daily. , Disp: 60 tablet, Rfl: 2  Losartan Potassium 25 MG Oral Tab, Take 25 mg by mouth nightly.  , Disp: , Rfl: 10  Metoprolol Succinate ER 50 MG Oral Tablet 24 Hr, Take 50 mg by mouth nightly., Disp: , Rfl: 0  spironol tachcyardia with pauses  - EP to evaluate per Cardiology     # elevated trop suspect demand ischemia     #Friedreich's ataxia    Quality:  · DVT Prophylaxis: eliquis   · CODE status: full   · Flores: no  · If COVID testing is negative, may discontinue isola Route User    12/14/2021 1002 Given 40 mEq Oral Eryn Roque [facility]    12/14/2021 0559 Given 40 mEq Oral Valentino Gene, RN          Vitals (last day)     Date/Time Temp Pulse Resp BP SpO2 Weight O2 Device O2 Flow Rate (L/min) [facility]    12/14/21 0700 — Plan  Nonischemic cardiomyopathy ejection fraction 15 to 20% currently decompensated proBNP is elevated patient was complaining of shortness of breath–Lasix 20 mg IV today continue Lasix 20 mg IV daily for the next 2 days  Nonsustained VT     Patient julia Lasix  · Longstanding history of atrial arrhythmias A. fib/flutter status post ablation ongoing medication adjustments recent increase in amiodarone--reports prolonged a flutter rapid rate last p.m. -remains on Eliquis  · Elevated troponin cardiology  cont

## 2021-12-14 NOTE — PROGRESS NOTES
Tele Vtach with hr-180s and lasted for 13 mins. 68/40. denies c/o chest pain. Rapid Response called. Dr. Nava Salvage  on bedside. Started Amio bolus and drip. Transfer to [facility]. Family member on bedside.

## 2021-12-14 NOTE — OCCUPATIONAL THERAPY NOTE
OT consult requested via functional mobility screening. Discussed with PT who spoke with pt and his mother, Ari Hurley, at bedside. Pt is shahram lift and wheelchair bound at baseline.  Ari Hurley reports no concerns regarding mobility nor ADLs at this time or in regards

## 2021-12-14 NOTE — PROGRESS NOTES
RRT  Patient with unstable VT, patient converted on his own. Amiodarone bolus given, drip initiated. CCM and Cardiology at bedside. Patient transferred to CCU.   Isac MERLOS

## 2021-12-15 PROCEDURE — 99232 SBSQ HOSP IP/OBS MODERATE 35: CPT | Performed by: HOSPITALIST

## 2021-12-15 RX ORDER — AMIODARONE HYDROCHLORIDE 200 MG/1
400 TABLET ORAL 2 TIMES DAILY WITH MEALS
Status: COMPLETED | OUTPATIENT
Start: 2021-12-15 | End: 2021-12-19

## 2021-12-15 NOTE — CONSULTS
Eliecer/Jovita Report of Consultation      Liliana Martinez Patient Status:  Inpatient    5/3/1990 MRN [id number]   [facility] 6NE-A Attending Benjamin Dinero MD   Hosp Day # 1 PCP Vickie Neal MD     Reason for Consultation     [facility]tr [facility] ([facility]    • Congestive heart disease ([facility]    • Depression    • Friedreich ataxia (HCC)    • Hearing impairment    • Incontinence    • Low vision    • Muscle weakness    • Problems with swallowing    • Scoliosis    • Sleep apnea    • Visual imp Systems:  Denies abdominal pain, N/V. No neurologic sx such as focal weakness or paresthesias. No cough. No visual disturbance. No fevers, chills.     Physical Exam:  Blood pressure (!) 85/67, pulse 80, temperature 97.8 °F (36.6 °C), temperature source Temp

## 2021-12-15 NOTE — PROGRESS NOTES
[facility]     Hospitalist Progress Note     Yordan Crews Patient Status:  Inpatient    5/3/1990 MRN [id number]   [facility] 6NE-A Attending Ilene Machuca MD   Hosp Day # 2 PCP Sly Lynne MD     Chief Complaint: hypoxia fast 7.8  --  7.7 6.9  --     < > = values in this interval not displayed. CrCl cannot be calculated (Unknown ideal weight.). No results for input(s): PTP, INR in the last 168 hours.          COVID-19 Lab Results    COVID-19  Lab Results   Component Jennifer discussed with RN , mom, pt      Juan Larose, VALDO            Supplementary Documentation:     Quality:  · DVT Prophylaxis: eliqiis   · CODE status:  Full   · Flores: no   · Central line:  No   · If COVID testing is negative, may discontinue isolation:

## 2021-12-15 NOTE — PROGRESS NOTES
[facility] Card[facility] Progress Note    Nidhi Ribeiro Patient Status:  Inpatient    5/3/1990 MRN [id number]   [facility] 6NE-A Attending Caroline Yoo MD   Hosp Day # 2 PCP Hood Melton MD     Subjective:  No acute events ove Neurological: He is alert. Skin: Skin is warm and dry.        Medications:  • Insulin Aspart Pen  1-10 Units Subcutaneous TID CC and HS   • lidocaine (PF)  5 mL Intradermal Once   • apixaban  5 mg Oral BID   • baclofen  20 mg Oral Nightly   • pantoprazo AM  ____________________________  Pt seen and examined and discussed with the APN.     Tele: Sinus rhythm 83 bpm.  1 episode of SVT/atrial tachycardia at 6 PM yesterday    Exam  CV-regular rate and rhythm S1-S2  Lungs -coarse breath sounds      Plan  -I had

## 2021-12-15 NOTE — PLAN OF CARE
Assumed care at [address]. Both parents at bedside. Pt with slurred and incomprehensive (Baseline). Continue on Amio gtt via right PICC. NSR on tele. Up to bedside commode with shahram lift. Plan of care discussed with parents. Continue to monitor.

## 2021-12-15 NOTE — PROGRESS NOTES
[facility]  Progress Note    Brittany Zapata Patient Status:  Inpatient    5/3/1990 MRN [id number]   [facility] 6NE-A Attending Patricia Torres MD   Hosp Day # 2 PCP Tien Yepez MD     Subjective:  Brittany Zapata is a(n) 28 year o Radial   DEV Room Air   GB 16.9         Medications reviewed     Assessment and Plan:   Patient Active Problem List:     Hyperglycemia     Atrial fibrillation with rapid ventricular response (HCC)     Upper respiratory tract infection, unspecified type role of repeat swallow study mom will think about it  · To review sats consider trial of O2 monitored-may benefit from home O2  · Continuing to follow  ·     CC     Amauri Ferreira MD  12/15/2021  40min  8:58 AM

## 2021-12-15 NOTE — PLAN OF CARE
Assumed care of patient this morning. Patient alert/oriented to self/place. Speech is severely slurred/incomprehensible most times. Mom at bedside. Patient is wheelchair bound. Unable to hold self upright. Turn q2h. Up to commode with sling lift.  Supposito

## 2021-12-15 NOTE — PAYOR COMM NOTE
--------------  12/14- 15 CONTINUED STAY REVIEW    Payor: Mark Mar #:  [id number]  Authorization Number: [id number]      12/14:    CARDS EP:  27 y/o male with history non-ischemic CM, atrial flutter status post Limited movements  Able to lift arms up   W/c bound      Assessment & Plan:          #Short of breath with intermittent episodes of hypoxia-known  nonischemic cardiomyopathy with EF 10-15%  - Pulm/ Cardio Following   - Pulm Consult- evaluate last year for 1-10 Units     Date Action Dose Route User    12/14/2021 2055 Given 1 Units Subcutaneous (Left Upper Arm) Dennise Boyle RN    12/14/2021 1651 Given 1 Units Subcutaneous (Right Upper Arm) Marla Palomares RN      pantoprazole (PROTONIX) EC tab 20 mg

## 2021-12-16 PROCEDURE — 99232 SBSQ HOSP IP/OBS MODERATE 35: CPT | Performed by: HOSPITALIST

## 2021-12-16 NOTE — CM/SW NOTE
12/16/21 1400   CM/SW Referral Data   Referral Source    Reason for Referral Discharge planning   Informant Father   Patient [address]   Patient lives with Parent(s)     Met with patient and father at bedside to

## 2021-12-16 NOTE — PROGRESS NOTES
[facility]  Progress Note    Silvano Beam Patient Status:  Inpatient    5/3/1990 MRN [id number]   [facility] 6NE-A Attending Jhoana Freire MD   Hosp Day # 3 PCP Ankit Neil MD     Subjective:  Silvanoluke Pinon is a(n) 28 year o BUN 18 17 15  --  19*   CREATSERUM 1.26 1.11 1.00  --  1.10     No results for input(s): PTP, INR, PTT in the last 168 hours. Cultures: Reviewed with the negative rapid    Radiology:  No results found.   X-rays reviewed      Medications reviewed     As cardiology discussion and assessment  · Consider trial thickened liquids versus repeat speech eval -family to decide    Reviewed with patient's dad at bedside and nursing as well    Ryan Basilio MD  12/16/2021  40min  8:26 AM

## 2021-12-16 NOTE — PLAN OF CARE
Assumed care approx 1930. Pt alert to self and place, delayed responses w/ many slurred incomprehensible words (at baseline), able to understand w/ assistance form parents.  On 2L NC, switched to home trilogy machine early this am. NSR, normotensive, afebri

## 2021-12-16 NOTE — PLAN OF CARE
Assumed care of patient this morning. Patient alert and orientedx4. Gutierrez/follows commands. Up to motorized chair from home with sling lift. tolerated well. Po amio given/gtt dc'd per orders. Right arm PICC remains. Vss. See assessment for complete details.

## 2021-12-16 NOTE — PROGRESS NOTES
[facility]     Hospitalist Progress Note     Silvano Beam Patient Status:  Inpatient    5/3/1990 MRN [id number]   [facility] 6NE-A Attending Jhoana Freire MD   Hosp Day # 3 PCP Ankit Neil MD     Chief Complaint: hypoxia fast 42*  --  46  --  39*  --   --    AST 29  --  38*  --  20  --   --    ALT 34  --  37  --  32  --   --    BILT 0.7  --  0.9  --  1.0  --   --    TP 7.8  --  7.7  --  6.9  --   --     < > = values in this interval not displayed.        CrCl cannot be calculate BB   Tolerating up to wc  Vol status compensated  Await recs from cards     Plan of care discussed with RN , father  pt      Lalita Shelton, VALDO            Supplementary Documentation:     Quality:  · DVT Prophylaxis: eliqiis   · CODE status:  Full   · Fo

## 2021-12-16 NOTE — PROGRESS NOTES
[facility] Cardiology Progress Note    Lorice Bamberger Patient Status:  Inpatient    5/3/1990 MRN [id number]   [facility] 6NE-A Attending Curry Brush MD   Hosp Day # 3 PCP Cici Srinivasan MD     Subjective:  No acute events ove Oral BID with meals   • metoprolol succinate  12.5 mg Oral Nightly   • Insulin Aspart Pen  1-10 Units Subcutaneous TID CC and HS   • lidocaine (PF)  5 mL Intradermal Once   • apixaban  5 mg Oral BID   • baclofen  20 mg Oral Nightly   • pantoprazole  20 mg

## 2021-12-17 ENCOUNTER — APPOINTMENT (OUTPATIENT)
Dept: GENERAL RADIOLOGY | Facility: HOSPITAL | Age: 31
DRG: 314 | End: 2021-12-17
Attending: STUDENT IN AN ORGANIZED HEALTH CARE EDUCATION/TRAINING PROGRAM
Payer: MEDICAID

## 2021-12-17 PROCEDURE — 99232 SBSQ HOSP IP/OBS MODERATE 35: CPT | Performed by: HOSPITALIST

## 2021-12-17 PROCEDURE — 71045 X-RAY EXAM CHEST 1 VIEW: CPT | Performed by: STUDENT IN AN ORGANIZED HEALTH CARE EDUCATION/TRAINING PROGRAM

## 2021-12-17 RX ORDER — POTASSIUM CHLORIDE 20 MEQ/1
40 TABLET, EXTENDED RELEASE ORAL ONCE
Status: COMPLETED | OUTPATIENT
Start: 2021-12-17 | End: 2021-12-17

## 2021-12-17 NOTE — CM/SW NOTE
Orders received for home o2 setup, has trilogy through marlee. Message to rn for desat study.      Ritika Tuttle RN,   [id number]

## 2021-12-17 NOTE — PLAN OF CARE
Assumed patient care at 0730. Patient resting in bed. VSS. Received room 3044, report given to [facility]. Patient belongings sent with patient.

## 2021-12-17 NOTE — PROGRESS NOTES
[facility]     Hospitalist Progress Note     Claudia Yu Patient Status:  Inpatient    5/3/1990 MRN [id number]   [facility] 6NE-A Attending Karen Allen MD   Hosp Day # 4 PCP Rekha Laureano MD     Chief Complaint: hypoxia fast ALT 34  --  37  --  32  --   --    BILT 0.7  --  0.9  --  1.0  --   --    TP 7.8  --  7.7  --  6.9  --   --     < > = values in this interval not displayed. CrCl cannot be calculated (Unknown ideal weight.).     No results for input(s): PTP, INR in need to obtain for home   Discussed w pulm  Check aric  tx to CTU [phone]   Talked w.// Doris at length very fearful of ongoing issues w/ breathing and cardiac rates. Aware he has fatel disease but asking for ways to prevent problems at home.       Plan of c

## 2021-12-17 NOTE — SLP NOTE
ADULT SWALLOWING EVALUATION    ASSESSMENT    ASSESSMENT/OVERALL IMPRESSION:  Order received for bedside swallow evaluation to r/o aspiration. Pt presented with hypoxemia, fevers and cough s/p recent Covid 19 booster.  Pmx includes Friedreich's ataxia and is trials. No overt clinical s/s of aspiration noted during controlled po trials utilizing strict aspiration precautions.     Oral with possible pharyngeal dysphagia - no overt clinical s/s of aspiration noted however mom reports hx of aspiration with recommen Diagnosis Date   • Anxiety state    • Atrial fibrillation (HCC)    • Congestive heart disease (HCC)    • Depression    • Friedreich ataxia (HCC)    • Hearing impairment    • Incontinence    • Low vision    • Muscle weakness    • Problems with swallowing consistent with possible esophageal involvement               GOALS  Goal #1 The patient will tolerate regular consistency and nectar liquids without overt signs or symptoms of aspiration with 95 % accuracy over 2 session(s).  In progress   Goal #2 The dave

## 2021-12-17 NOTE — PROGRESS NOTES
[facility]  Progress Note    Cindi Finch Patient Status:  Inpatient    5/3/1990 MRN [id number]   [facility] 6NE-A Attending Fco Cardoso MD   Hosp Day # 4 PCP Vannessa Chun MD     Subjective:  Cindi Finch is a(n) 28 year o fibrillation with rapid ventricular response (HCC)     Upper respiratory tract infection, unspecified type     Elevated troponin     Cardiomyopathy (HCC)     TIA (obstructive sleep apnea)     Parainfluenza     WILLIAM (acute kidney injury) ([facility]     Elevated L

## 2021-12-17 NOTE — PLAN OF CARE
Received patient at [address] from CCU. Alert and Oriented at baseline, is [place] and hard to understand. Mom who is caregiver at home can understand and is with patient. Tele Rhythm NSR. O2 saturation 96% On room air. Breath sounds clear.  Pt oriented to room and Monitor vital signs, rhythm, and trends  - Monitor for bleeding, hypotension and signs of decreased cardiac output  - Evaluate effectiveness of vasoactive medications to optimize hemodynamic stability  - Monitor arterial and/or venous puncture sites for bl

## 2021-12-18 PROCEDURE — 99232 SBSQ HOSP IP/OBS MODERATE 35: CPT | Performed by: HOSPITALIST

## 2021-12-18 RX ORDER — POTASSIUM CHLORIDE 20 MEQ/1
40 TABLET, EXTENDED RELEASE ORAL ONCE
Status: COMPLETED | OUTPATIENT
Start: 2021-12-18 | End: 2021-12-18

## 2021-12-18 RX ORDER — FUROSEMIDE 10 MG/ML
20 INJECTION INTRAMUSCULAR; INTRAVENOUS ONCE
Status: COMPLETED | OUTPATIENT
Start: 2021-12-18 | End: 2021-12-18

## 2021-12-18 NOTE — PROGRESS NOTES
Progress Note  Claudia Ireland Patient Status:  Inpatient    5/3/1990 MRN [id number]   [facility] 8NE-A Attending Evgeny Javier MD   Hosp Day # 5 PCP Horace Sue MD     Subjective:  Resting comfortably in bed, no acute distress.  Had CC and HS   • lidocaine (PF)  5 mL Intradermal Once   • apixaban  5 mg Oral BID   • baclofen  20 mg Oral Nightly   • pantoprazole  20 mg Oral QAM AC             Assessment:  · NICM- dating back to at least 2008, EF ~15%, chronic BNP elevation > 1000  · Afl

## 2021-12-18 NOTE — PLAN OF CARE
Assumed care of patient 12/17/21 1930. Pt here w/increasing SOB, VT - EF 15%. Hx of Friedreich's Ataxia. Patient is alert, unable to accurately assess orientation but pt is aware of mom, responds to name and is able to follow some commands.  Speech garbled Assess pt frequently for physical needs  - Identify cognitive and physical deficits and behaviors that affect risk of falls.   - [place] fall precautions as indicated by assessment.  - Educate pt/family on patient safety including physical limitations  - needed  - Instruct patient on self management of diabetes  Outcome: Progressing

## 2021-12-18 NOTE — PLAN OF CARE
Assumed care at 0700. Pt is alert, unable to assess orientation but aware of mom and responds to name. Mom notes pt has \"blurry vision\" because he keeps requesting glasses \"to be cleaned. \" speech is unclear at baseline.  Respirations are diminished bi venous puncture sites for bleeding and/or hematoma  - Assess quality of pulses, skin color and temperature  - Assess for signs of decreased coronary artery perfusion - ex.  Angina  - Evaluate fluid balance, assess for edema, trend weights  Outcome: Progress

## 2021-12-18 NOTE — PROGRESS NOTES
[facility]  Progress Note    Mireille Cho Patient Status:  Inpatient    5/3/1990 MRN [id number]   [facility] 6NE-A Attending Richard Chun MD   Hosp Day # 5 PCP Arloa Osgood, MD     Subjective:  Mireille Cho is a(n) 28 year o markings in lungs which could represent interstitial edema. There is cardiomegaly.     Dictated by (CST): Maty Bustamante MD on 12/17/2021 at 11:33 PM     Finalized by (CST): Maty Bustamante MD on 12/17/2021 at 11:33 PM       Chest x-ray reviewed from [facility] the night and during the day as needed. 12/15 ABG noted. · Day 4 of amnio loading watching for bradycardia. Mildly hypotensive; Appreciate cardiology input and management.   · Plan for rest oxygen 1 L to maintain sats above 90%  · Ongoing discussion concer

## 2021-12-19 PROCEDURE — 99232 SBSQ HOSP IP/OBS MODERATE 35: CPT | Performed by: HOSPITALIST

## 2021-12-19 RX ORDER — MIDODRINE HYDROCHLORIDE 5 MG/1
5 TABLET ORAL 3 TIMES DAILY
Status: DISCONTINUED | OUTPATIENT
Start: 2021-12-19 | End: 2021-12-19

## 2021-12-19 RX ORDER — AMIODARONE HYDROCHLORIDE 200 MG/1
400 TABLET ORAL DAILY
Status: DISCONTINUED | OUTPATIENT
Start: 2021-12-20 | End: 2021-12-23

## 2021-12-19 NOTE — PROGRESS NOTES
[facility]     Hospitalist Progress Note     Jo Ann Burger Patient Status:  Inpatient    5/3/1990 MRN [id number]   [facility] 8NE-A Attending Brant Smart MD   Hosp Day # 6 PCP Lupe Jones MD     Chief Complaint: sob hypoxia Date    COVID19 Not Detected 12/13/2021       Pro-Calcitonin  No results for input(s): PCT in the last 168 hours. Cardiac  Recent Labs   Lab 12/19/21  0434   PBNP 1,481*       Creatinine Kinase  No results for input(s): CK in the last 168 hours.     Infl

## 2021-12-19 NOTE — PLAN OF CARE
Assumed care of patient 12/18/21 1930. Patient remains alert, orientation unable to be assessed. Speech garbled, incomprehensible at times - mom at bedside, able to understand him well. Plan to get up to wheelchair tomorrow during day.  O2 sats % on 1 cardiac output  - Evaluate effectiveness of vasoactive medications to optimize hemodynamic stability  - Monitor arterial and/or venous puncture sites for bleeding and/or hematoma  - Assess quality of pulses, skin color and temperature  - Assess for signs o

## 2021-12-19 NOTE — PROGRESS NOTES
[facility]  Progress Note    Wen Issaina Patient Status:  Inpatient    5/3/1990 MRN [id number]   [facility] 6NE-A Attending Aleena Lim MD   Hosp Day # 6 PCP Ramon Steward MD     Subjective:  Carver April is a(n) 28 year o fields      Medications reviewed     Assessment and Plan:   Patient Active Problem List:     Hyperglycemia     Atrial fibrillation with rapid ventricular response (HCC)     Upper respiratory tract infection, unspecified type     Elevated troponin     Cardi discussion concerning possible pacemaker; 5 beats of VT last night; continue conservative medical management. Continue AC witjh eliquis.     Gayle Tineo DNP, ACNP-[facility]

## 2021-12-19 NOTE — PROGRESS NOTES
Progress Note  Lulu España Patient Status:  Inpatient    5/3/1990 MRN [id number]   [facility] 8NE-A Attending Willam Henriquez MD   Hosp Day # 6 PCP Ellis Mai MD     Subjective: In bed on exam. Appears comfortable.  Had episode o deficits, alert        Medications:    • amiodarone  400 mg Oral BID with meals   • metoprolol succinate  12.5 mg Oral Nightly   • Insulin Aspart Pen  1-10 Units Subcutaneous TID CC and HS   • lidocaine (PF)  5 mL Intradermal Once   • apixaban  5 mg Oral B

## 2021-12-19 NOTE — PLAN OF CARE
Assumed care at 0700  Pt is alert. Aware of mom and staff while in room, but unable to accurately assess orientation. Pt is currently on 1L of O2, sats above 92%. Lung sounds diminished bilaterally. Pt is on telemetry monitoring, in NSR.  Occasional runs of rhythm, and trends  - Monitor for bleeding, hypotension and signs of decreased cardiac output  - Evaluate effectiveness of vasoactive medications to optimize hemodynamic stability  - Monitor arterial and/or venous puncture sites for bleeding and/or hematom

## 2021-12-20 PROCEDURE — 99232 SBSQ HOSP IP/OBS MODERATE 35: CPT | Performed by: HOSPITALIST

## 2021-12-20 RX ORDER — SPIRONOLACTONE 25 MG/1
12.5 TABLET ORAL DAILY
Status: DISCONTINUED | OUTPATIENT
Start: 2021-12-21 | End: 2021-12-23

## 2021-12-20 RX ORDER — BUMETANIDE 0.25 MG/ML
2 INJECTION, SOLUTION INTRAMUSCULAR; INTRAVENOUS DAILY
Status: DISCONTINUED | OUTPATIENT
Start: 2021-12-20 | End: 2021-12-23

## 2021-12-20 RX ORDER — MIDODRINE HYDROCHLORIDE 2.5 MG/1
2.5 TABLET ORAL 3 TIMES DAILY
Status: DISCONTINUED | OUTPATIENT
Start: 2021-12-20 | End: 2021-12-23

## 2021-12-20 NOTE — PROGRESS NOTES
[facility]     Hospitalist Progress Note     Cindi Finch Patient Status:  Inpatient    5/3/1990 MRN [id number]   [facility] 8NE-A Attending Fco Cardoso MD   Hosp Day # 7 PCP Vannessa Chun MD     Chief Complaint: sob hypoxia for input(s): PTP, INR in the last 168 hours. COVID-19 Lab Results    COVID-19  Lab Results   Component Value Date    COVID19 Not Detected 12/13/2021       Pro-Calcitonin  No results for input(s): PCT in the last 168 hours.     Cardiac  Recent Labs no  · Central line: no  · If COVID testing is negative, may discontinue isolation: yes     Will the patient be referred to [facility] on discharge?: no  Estimated date of discharge: tbd  Discharge is dependent on: progress  At this point Mr. Herrera is expected

## 2021-12-20 NOTE — PAYOR COMM NOTE
--------------  12/18 - 20 CONTINUED STAY REVIEW    Payor: Mark Mar #:  [id number]  Authorization Number: [id number]       12/18:    CARDS:    Subjective:  Resting comfortably in bed, no acute distress.  Had coug Trilogy. Intermittent desaturations into mid 80s with quick recovery into 90s.  Discussed with mother at bedside.     Assessment:     · Diagnosis Friedreich's  ataxia  ·  nonischemic cardiomyopathy EF 10 to 15% on echo with  decompensated with pulmonary kaiden < > 3.8 3.8  3.8 4.1     --  108 106  --    CO2 27.0  --  28.0 29.0  --        Medications:     • amiodarone  400 mg Oral BID with meals   • metoprolol succinate  12.5 mg Oral Nightly   • Insulin Aspart Pen  1-10 Units Subcutaneous TID CC and HS   • PBNP 1,481*     Medications:   • amiodarone  400 mg Oral Daily   • metoprolol succinate  12.5 mg Oral Nightly   • Insulin Aspart Pen  1-10 Units Subcutaneous TID CC and HS   • lidocaine (PF)  5 mL Intradermal Once   • apixaban  5 mg Oral BID   • baclofen (Oral)   Resp 18   Wt 132 lb (59.9 kg)   SpO2 93%   BMI 20.07 kg/m²     Physical Exam:  Blood pressure (!) 88/58, pulse 65, temperature 98 °F (36.7 °C), temperature source Oral, resp. rate 18, weight 132 lb (59.9 kg), SpO2 93 %.   General: Awake tired   REG Yasmin Dexter RN      midodrine (PROAMATINE) tab 5 mg     Date Action Dose Route User    12/19/2021 1330 Given 5 mg Oral Mark Serrano RN      pantoprazole (PROTONIX) EC tab 20 mg     Date Action Dose Route User    12/20/2021 0640 Given 20 mg Oral Chandrakant Barriga

## 2021-12-20 NOTE — DIETARY NOTE
[facility]    NUTRITION ASSESSMENT    Pt does not meet malnutrition criteria.     NUTRITION INTERVENTION:    1. RD nutrition Care Plan- Encouraged increased PO intake, Encouraged small frequent meals with emphasis on high calorie/high protein and Initi Allergies: No  Cultural/Ethnic/Scientologist Preferences Addresses: Yes    GI SYSTEM REVIEW: WNL    NUTRITION RELATED PHYSICAL FINDINGS:     1. Body Fat/Muscle Mass: mild depletion body fat and mild depletion muscle mass      2.  Fluid Accumulation: none     NU

## 2021-12-20 NOTE — PLAN OF CARE
Pt is AO, unable to understand speech. Follows commands. 1L as needed during the day, trilogy at night. QID accucheck. Up with total lift. Primofit in place. Takes his pills whole with thickened water. Mother at bedside, very helpful with care.

## 2021-12-20 NOTE — PROGRESS NOTES
[facility]  Progress Note    Trinh Brian Patient Status:  Inpatient    5/3/1990 MRN [id number]   [facility] 6NE-A Attending Rafia León MD   Hosp Day # 7 PCP Bautista Gayle MD     Subjective:  Trinh Brian is a(n) 28 year o 75 73   CA 8.9 9.1 9.0    143 140   K 3.8 3.8  3.8 4.1  4.1    106 105   CO2 28.0 29.0 28.0       Cultures: Reviewed    Radiology: reviewed   No new imaging today    Medications reviewed     Assessment  · Friedreich's  ataxia  · Nonischemic car

## 2021-12-20 NOTE — PROGRESS NOTES
[facility]  Advanced Heart Failure Progress Note    Nidhi Ribeiro Patient Status:  Inpatient    5/3/1990 MRN [id number]   [facility] 8NE-A Attending Caroline Yoo MD   Hosp Day # 7 PCP Hood Melton MD     Subjective:   The patien Generalized weakness. Skin: Warm and dry. Labs:     Lab Results   Component Value Date    INR 1.43 (H) 03/10/2020     No results for input(s): BNPML in the last 168 hours.   BUN (mg/dL)   Date Value   12/19/2021 22 (H)   12/18/2021 20 (H)   12/17/202 ([facility] [address]     Elevated LFTs     Ileus (HCC)     Pneumonia     Viral bronchitis     Chronic congestive heart failure (HCC)     Friedreich ataxia (HCC)     Shortness of breath     Atrial fibrillation, chronic (HCC)     Acute on chronic congestive heart failure,

## 2021-12-20 NOTE — PLAN OF CARE
Assumed care of patient at 0730. Alert. Responds to calling his name. Patient speech a bit slurred/garbled. Not speaking a lot. Lungs diminished. SPO2 95% on room air. Sinus rhythm on telemetry. SBP 80s. Cardiology notified. Orders rec'd.  Will diur of pulses, skin color and temperature  - Assess for signs of decreased coronary artery perfusion - ex.  Angina  - Evaluate fluid balance, assess for edema, trend weights  Outcome: Progressing  Goal: Absence of cardiac arrhythmias or at baseline  Description

## 2021-12-21 PROCEDURE — 99222 1ST HOSP IP/OBS MODERATE 55: CPT | Performed by: NURSE PRACTITIONER

## 2021-12-21 PROCEDURE — 99232 SBSQ HOSP IP/OBS MODERATE 35: CPT | Performed by: HOSPITALIST

## 2021-12-21 RX ORDER — POTASSIUM CHLORIDE 14.9 MG/ML
20 INJECTION INTRAVENOUS ONCE
Status: DISCONTINUED | OUTPATIENT
Start: 2021-12-21 | End: 2021-12-21

## 2021-12-21 RX ORDER — POTASSIUM CHLORIDE 14.9 MG/ML
20 INJECTION INTRAVENOUS ONCE
Status: DISCONTINUED | OUTPATIENT
Start: 2021-12-21 | End: 2021-12-21 | Stop reason: SDUPTHER

## 2021-12-21 RX ORDER — POTASSIUM CHLORIDE 29.8 MG/ML
40 INJECTION INTRAVENOUS ONCE
Status: COMPLETED | OUTPATIENT
Start: 2021-12-21 | End: 2021-12-21

## 2021-12-21 RX ORDER — POTASSIUM CHLORIDE 20 MEQ/1
20 TABLET, EXTENDED RELEASE ORAL ONCE
Status: COMPLETED | OUTPATIENT
Start: 2021-12-21 | End: 2021-12-21

## 2021-12-21 NOTE — CM/SW NOTE
Received referral ., kang noriega Called and spoke with Oscar Segovia by phone and gave her basic hospice information. Scheduled additional time to meet tomorrow to go over questions . She will call in am to determine time.

## 2021-12-21 NOTE — PAYOR COMM NOTE
--------------  12/21 CONTINUED STAY REVIEW    Payor: [address] [place] [address]  Subscriber #:  [id number]  Authorization Number: [id number]      PULM:    Michelle Vasquez is a(n) 28year old male remains afebrile.  No acute events overnight, disorder--remains on nocturnal trilogy and as needed  · Awake episodes of hypoventilation/apnea/desaturation  · Aspiration risk with dysphagia-family reports stable  · Elevated hemoglobin-plan to assess for O2 needs     Plan:  · Continue to monitor respira bumex yesterday, creatinine remains stable at 1.13   · Daily weights and I/Os     Plan of care discussed with patient, RN.     Raquel Gannon, APRN  12/21/2021  9:30 AM  Would plan on discharge tomorrow with Torsemide 5 mg PO daily.   Will rito mEq Intravenous Heidy Gallardo RN      spironolactone (ALDACTONE) partial tablet 12.5 mg     Date Action Dose Route User    12/21/2021 1231 Given 12.5 mg Oral Heidy Gallardo RN          Vitals (last day)     Date/Time Temp Pulse Resp BP SpO2 O2 Device    1

## 2021-12-21 NOTE — PROGRESS NOTES
[facility]  Progress Note    Nidhi Ribeiro Patient Status:  Inpatient    5/3/1990 MRN [id number]   [facility] 6NE-A Attending Caroline Yoo MD   Hosp Day # 8 PCP Hood Melton MD     Subjective:  Nidhi Ribeiro is a(n) 28 year o 1. 13   GFRAA 87 84 100   GFRNAA 75 73 87   CA 9.1 9.0 8.8    140 142   K 3.8  3.8 4.1  4.1 3.1*    105 103   CO2 29.0 28.0 33.0*       Cultures: Reviewed    Radiology: reviewed   No new imaging today    Medications reviewed     Assessment  · Fr

## 2021-12-21 NOTE — PLAN OF CARE
Pt is alert, on 1L of oxygen during the day, uses trilogy machine at night. SR on tele. SBP on the lower side. Incontinent, using a primofit. Attempting to record accurate outputs but the primofit  does leak. QID accucheck. Wheel chair bound.

## 2021-12-21 NOTE — PROGRESS NOTES
[facility]     Hospitalist Progress Note     Claudia Ireland Patient Status:  Inpatient    5/3/1990 MRN [id number]   [facility] 8NE-A Attending Evgeny Javire MD   Hosp Day # 8 PCP Horcae Sue MD     Chief Complaint: sob hypoxia in the last 168 hours. COVID-19 Lab Results    COVID-19  Lab Results   Component Value Date    COVID19 Not Detected 12/13/2021       Pro-Calcitonin  No results for input(s): PCT in the last 168 hours.     Cardiac  Recent Labs   Lab 12/19/21  0699 chewing   #WILLIAM-creatinine level better today despite aggressive diuretic         PLAN as above  Replace k   Cr 1.13  Palliative care to meet with parents and Mark Hylton.   Will be discussing CODE STATUS and overall trajectory of his FA-his mother discussed with

## 2021-12-21 NOTE — DIETARY NOTE
[facility]    NUTRITION ASSESSMENT    Pt does not meet malnutrition criteria.     NUTRITION INTERVENTION:    1. RD nutrition Care Plan- Encouraged increased PO intake, Encouraged small frequent meals with emphasis on high calorie/high protein and Initi %    12/18/21 1559 50 %    12/19/21 0901 100 %    12/21/21 0845 100 %        FOOD/NUTRITION RELATED HISTORY:   Appetite: Poor  Intake: 75%  Intake Meeting Needs: No, but supplements to maximize  Food Allergies: No  Cultural/Ethnic/[place]ruze Preferences Add

## 2021-12-21 NOTE — PROGRESS NOTES
..    Progress Note  Claudia Yu Patient Status:  Inpatient    5/3/1990 MRN [id number]   [facility] 8NE-A Attending Karen Allen MD   Hosp Day # 8 PCP Rekha Laureano MD     Subjective:  Pt resting comfortably in bed.  Family at bedsi TID   • spironolactone  12.5 mg Oral Daily   • bumetanide  2 mg Intravenous Daily   • amiodarone  400 mg Oral Daily   • metoprolol succinate  12.5 mg Oral Nightly   • Insulin Aspart Pen  1-10 Units Subcutaneous TID CC and HS   • lidocaine (PF)  5 mL Intrad

## 2021-12-21 NOTE — SLP NOTE
SPEECH DAILY NOTE - INPATIENT    ASSESSMENT & PLAN   ASSESSMENT  Pt seen for dysphagia tx to assess tolerance with recommended diet, ensure appropriate utilization of aspiration precautions and provide pt/family education.  Chart review revealed pt with dec accuracy over 2 session(s).  met   Goal #2 The patient/family/caregiver will demonstrate understanding and implementation of aspiration precautions and swallow strategies independently over 2 session(s).    met   Goal #3 The patient will utilize compensator

## 2021-12-22 PROCEDURE — 99232 SBSQ HOSP IP/OBS MODERATE 35: CPT | Performed by: INTERNAL MEDICINE

## 2021-12-22 RX ORDER — METOPROLOL SUCCINATE 25 MG/1
12.5 TABLET, EXTENDED RELEASE ORAL NIGHTLY
Qty: 30 TABLET | Refills: 2 | Status: SHIPPED | OUTPATIENT
Start: 2021-12-22

## 2021-12-22 RX ORDER — METOPROLOL SUCCINATE 25 MG/1
12.5 TABLET, EXTENDED RELEASE ORAL NIGHTLY
Qty: 30 TABLET | Refills: 2 | Status: SHIPPED | OUTPATIENT
Start: 2021-12-22 | End: 2021-12-22

## 2021-12-22 RX ORDER — TORSEMIDE 5 MG/1
5 TABLET ORAL DAILY
Qty: 30 TABLET | Refills: 2 | Status: SHIPPED | OUTPATIENT
Start: 2021-12-22

## 2021-12-22 RX ORDER — POTASSIUM CHLORIDE 20 MEQ/1
40 TABLET, EXTENDED RELEASE ORAL ONCE
Status: COMPLETED | OUTPATIENT
Start: 2021-12-22 | End: 2021-12-22

## 2021-12-22 RX ORDER — MIDODRINE HYDROCHLORIDE 2.5 MG/1
2.5 TABLET ORAL 3 TIMES DAILY
Qty: 90 TABLET | Refills: 2 | Status: SHIPPED | OUTPATIENT
Start: 2021-12-22 | End: 2021-12-23

## 2021-12-22 RX ORDER — POTASSIUM CHLORIDE 20 MEQ/1
40 TABLET, EXTENDED RELEASE ORAL EVERY 4 HOURS
Status: DISPENSED | OUTPATIENT
Start: 2021-12-22 | End: 2021-12-22

## 2021-12-22 RX ORDER — LOSARTAN POTASSIUM 25 MG/1
12.5 TABLET ORAL DAILY
Qty: 30 TABLET | Refills: 2 | Status: SHIPPED | OUTPATIENT
Start: 2021-12-25 | End: 2021-12-23

## 2021-12-22 RX ORDER — AMIODARONE HYDROCHLORIDE 200 MG/1
TABLET ORAL
Qty: 30 TABLET | Refills: 2 | Status: SHIPPED | OUTPATIENT
Start: 2021-12-22

## 2021-12-22 NOTE — PROGRESS NOTES
..    Progress Note  Claudia Ireland Patient Status:  Inpatient    5/3/1990 MRN [id number]   [facility] 8NE-A Attending Evgeny Javier MD   Hosp Day # 9 PCP Horace Sue MD     Subjective:  Pt resting comfortably in bed.  Family at bedsi Exam:    Gen: alert, NAD. Heent: pupils equal, reactive.  Mucous membranes moist. Hearing impairment  Neck: no jvd  Cardiac: regular rate and rhythm, normal S1,S2  Lungs: CTA  Abd: soft, slightly distended  Ext: no edema  Skin: Warm, dry  Neuro: No focal d

## 2021-12-22 NOTE — CM/SW NOTE
Called mom to have follow up discussion, awaiting response. Called BETSY Vargas to see if any update . Will continue to follow up with mom/.

## 2021-12-22 NOTE — PROGRESS NOTES
[facility]     Hospitalist Progress Note     Javed Coburn Patient Status:  Inpatient    5/3/1990 MRN [id number]   [facility] 8NE-A Attending Giovana Cho, DO    Hosp Day # 9 PCP María Elena Holbrook MD     Chief Complaint: sob hypoxia Results   Component Value Date    COVID19 Not Detected 12/13/2021       Pro-Calcitonin  No results for input(s): PCT in the last 168 hours.     Cardiac  Recent Labs   Lab 12/19/21  0434   PBNP 1,481*       Creatinine Kinase  No results for input(s): CK in t #WILLIAM- renal function stable   # hypokalemia  Replace K          PLAN as above  Replace k   DNAR/select now  hospice to meet w/ pt/ family  Needs home o2  Spoke w/ Sw update [address] home today     Paty Mendoza NP    Patient seen and examined

## 2021-12-22 NOTE — PLAN OF CARE
A&Ox4   Family at bedside. O2 sat WNL on 1L PRN during day & trilogy at night  NSR on tele  K replaced  Primofit in place- voiding clear, yellow urine  Wheelchair bound. Total lift used to put pt on toliet to have BM. QID  Nectar thick liquids.   Med wh signs of decreased coronary artery perfusion - ex.  Angina  - Evaluate fluid balance, assess for edema, trend weights  Outcome: Progressing  Goal: Absence of cardiac arrhythmias or at baseline  Description: INTERVENTIONS:  - Continuous cardiac monitoring, m

## 2021-12-22 NOTE — CM/SW NOTE
Referrals sent via ines for home health care without success.   Additional referrals sent with focus to patient's residence in [place] as well as agencies that might be able to provide hospice services if goals of care support this type of care--await

## 2021-12-22 NOTE — PLAN OF CARE
Assumed care of patient at 0730. Patient is alert. Patient oriented to self. Unable to assess other orientation questions as patient speech is garbled. Oxygenation stable on 1L. Trelegy while asleep. Tele: Normal sinus rhythm, rare PVCs.  Potassium replaced to optimize hemodynamic stability  - Monitor arterial and/or venous puncture sites for bleeding and/or hematoma  - Assess quality of pulses, skin color and temperature  - Assess for signs of decreased coronary artery perfusion - ex.  Angina  - Evaluate flui

## 2021-12-22 NOTE — PROGRESS NOTES
[facility]  Progress Note    Mireille Cho Patient Status:  Inpatient    5/3/1990 MRN [id number]   [facility] 6NE-A Attending Richard Chun MD   Hosp Day # 9 PCP Arloa Osgood, MD     Subjective:  Mireille Cho is a(n) 28 year o 22* 21*  --    CREATSERUM 1.27  --  1.31* 1.13  --    GFRAA 87  --  84 100  --    GFRNAA 75  --  73 87  --    CA 9.1  --  9.0 8.8  --      --  140 142  --    K 3.8  3.8   < > 4.1  4.1 3.1* 3.4*     --  105 103  --    CO2 29.0  --  28.0 33.0*  -

## 2021-12-22 NOTE — DISCHARGE SUMMARY
[facility] [place] HOSPITALIST  DISCHARGE SUMMARY     Han Mcintyre Patient Status:  Inpatient    5/3/1990 MRN [id number]   [facility] 8NE-A Attending Petra Sinha MD   Hosp Day # 10 PCP Maine Mehta MD     Date of Admission: 2021  Date o has slowly returned to a resting heart rate in the 60s and was able to have reinitiation of beta-blocker with no more runs of atrial tachycardia we will go out on higher dose amiodarone.   Patient also developed volume overload required some aggressive diur work could not find an accepting home care agency. Parents are considering options for their son I do not want him to suffer they have made him a DN AR select aware of the nature of his disease. His problem started after he received his COVID vaccine.   P cardiac diet low-sodium or as able to tolerate and desires  • Midodrine twice daily as may be causing urinary retention  • Flomax for 2 weeks if urinary issues resolved may DC  • Unable to find an accepting home care agency in the [facility] area    Lab/Te daily. Refills: 0     Jardiance 10 MG Tabs  Generic drug: empagliflozin      Take 10 mg by mouth daily. Refills: 0     Multivital Tabs      Take 1 tablet by mouth daily.    Refills: 0     omeprazole 20 MG Cpdr  Commonly known as: PRILOSEC      Take 20 m Wheelchair-bound extreme muscular weakness, cognitive issues  Musculoskeletal: Moves all extremities. Extremities are weak  Extremities: Trace abdominal edema.   ----------------------------------------------------------------------------------------------

## 2021-12-23 VITALS
BODY MASS INDEX: 18 KG/M2 | OXYGEN SATURATION: 97 % | WEIGHT: 120.88 LBS | TEMPERATURE: 98 F | HEART RATE: 62 BPM | RESPIRATION RATE: 18 BRPM | SYSTOLIC BLOOD PRESSURE: 89 MMHG | DIASTOLIC BLOOD PRESSURE: 55 MMHG

## 2021-12-23 PROCEDURE — 99239 HOSP IP/OBS DSCHRG MGMT >30: CPT | Performed by: INTERNAL MEDICINE

## 2021-12-23 RX ORDER — TAMSULOSIN HYDROCHLORIDE 0.4 MG/1
0.4 CAPSULE ORAL DAILY
Qty: 30 CAPSULE | Refills: 0 | Status: SHIPPED | OUTPATIENT
Start: 2021-12-23 | End: 2021-12-23

## 2021-12-23 RX ORDER — MIDODRINE HYDROCHLORIDE 2.5 MG/1
2.5 TABLET ORAL
Status: DISCONTINUED | OUTPATIENT
Start: 2021-12-23 | End: 2021-12-23

## 2021-12-23 RX ORDER — TAMSULOSIN HYDROCHLORIDE 0.4 MG/1
0.4 CAPSULE ORAL DAILY
Status: DISCONTINUED | OUTPATIENT
Start: 2021-12-23 | End: 2021-12-23

## 2021-12-23 RX ORDER — MIDODRINE HYDROCHLORIDE 2.5 MG/1
2.5 TABLET ORAL
Qty: 60 TABLET | Refills: 2 | Status: SHIPPED | OUTPATIENT
Start: 2021-12-23

## 2021-12-23 RX ORDER — POTASSIUM CHLORIDE 750 MG/1
10 TABLET, EXTENDED RELEASE ORAL DAILY
Qty: 30 TABLET | Refills: 0 | Status: SHIPPED | OUTPATIENT
Start: 2021-12-23

## 2021-12-23 RX ORDER — TORSEMIDE 5 MG/1
5 TABLET ORAL DAILY
Status: DISCONTINUED | OUTPATIENT
Start: 2021-12-23 | End: 2021-12-23

## 2021-12-23 NOTE — PROGRESS NOTES
[facility]  Progress Note    Lorice Bamberger Patient Status:  Inpatient    5/3/1990 MRN [id number]   [facility] 6NE-A Attending Curry Brush MD   Hosp Day # 10 PCP Cici Srinivasan MD     Subjective:  Lorice Bamberger is a 28year old --   --  17  --    CREATSERUM 1.31*  --  1.13  --   --  1.40*  --    GFRAA 84  --  100  --   --  77  --    GFRNAA 73  --  87  --   --  67  --    CA 9.0  --  8.8  --   --  9.0  --      --  142  --   --  141  --    K 4.1  4.1   < > 3.1*   < > 3.4* 3.4*

## 2021-12-23 NOTE — PROGRESS NOTES
..    Progress Note  Hangrupo Mcintyre Patient Status:  Inpatient    5/3/1990 MRN [id number]   [facility] 8NE-A Attending Petra Sinha MD   Hosp Day # 10 PCP Maine Mehta MD     Subjective:  Pt resting comfortably in bed.  Has been havin breath. Gastrointestinal: Positive for bloating. Genitourinary:        +urinary retention       Physical Exam:    Gen: alert, NAD. Heent: pupils equal, reactive.  Mucous membranes moist. Hearing impairment  Neck: no jvd  Cardiac: regular rate and rhyt

## 2021-12-23 NOTE — PLAN OF CARE
Pt discharged to home. Being driven home by family. Reviewed discharge instructions with family. Prescriptions provided by meds to beds. Nicko O2 tank provided by respiratory therapist. Margarito Amaya to follow up with all providers as directed.  Family s

## 2021-12-23 NOTE — PROGRESS NOTES
[facility]     Hospitalist Progress Note     Nidhi Ribeiro Patient Status:  Inpatient    5/3/1990 MRN [id number]   [facility] 8NE-A Attending Antonia Renteria, DO    Hosp Day # 10 PCP Hood Melton MD     Chief Complaint: sob hypoxia --   --   --    ALT 30  --   --   --   --   --   --   --   --    BILT 0.9  --   --   --   --   --   --   --   --    TP 7.0  --   --   --   --   --   --   --   --     < > = values in this interval not displayed.        CrCl cannot be calculated (Unknown idea home meds at [place]    # constipation    Resolved   # Poor po intake - resolved    #WILLIAM- renal function stable   # hypokalemia  Replace K     # urinary retention  UA clean 12/22, repeat now tea colored.  Needed to be str cath, midodrine may be causing retention

## 2021-12-23 NOTE — CM/SW NOTE
Unsuccessful attempts to secure [facility]--updated cristofer huerta    Earlier, updated patient's mother of inability to [address]

## 2021-12-23 NOTE — PAYOR COMM NOTE
--------------  12/23 CONTINUED STAY REVIEW    Payor: Mark Mar #:  [id number]  Authorization Number: [id number]    Plan for DC today. PULM:  Rancho Ma is a 28year old male remains afebrile.  No acute ev diuretic  · Longstanding history of atrial arrhythmias A. fib/flutter status post ablation ongoing medication adjustments recent increase in amiodarone----recurrent events of rapid a flutter.-Currently with amiodarone loading  · Elevated troponin cardiolog 12/19/21  0434 12/21/21  0657 12/21/21  0657 12/21/21  2241 12/22/21  0912 12/22/21  2348   *  --  107*  --   --  159*  --    BUN 22*  --  21*  --   --  17  --    CREATSERUM 1.31*  --  1.13  --   --  1.40*  --    GFRAA 84  --  100  --   --  77  -- ataxia     Plan:  · Continue amiodarone at 400mg daily x 1 week, then will reduce to 200mg PO daily on 12/27  · Continue low dose betablocker, spironolactone, and midodrine, continue to monitor BP    · Transition from IV bumex to torsemide 5 mg PO daily as INR in the last 168 hours.     Invalid input(s): LYM#, MONO#, BASOS#, EOSIN#                 Recent Labs   Lab 12/18/21  0448 12/18/21  0448 12/19/21  0434 12/19/21  0434 12/21/21  0657 12/21/21  0657 12/21/21  2241 12/22/21  0912 12/22/21  2348   * Aspart Pen  1-10 Units Subcutaneous TID CC and HS   • lidocaine (PF)  5 mL Intradermal Once   • apixaban  5 mg Oral BID   • baclofen  20 mg Oral Nightly   • pantoprazole  20 mg Oral QAM AC               Assessment & Plan:          Short of breath with inte succinate (Toprol XL) partial tablet 12.5 mg     Date Action Dose Route User    12/22/2021 2042 Given 12.5 mg Oral Arleen Shelby RN      midodrine (PROAMATINE) tab 2.5 mg     Date Action Dose Route User    12/23/2021 0602 Given 2.5 mg Oral Mike Shelby

## 2021-12-23 NOTE — PLAN OF CARE
A&Ox4  Family at bedside  O2 sat WNL on 1L during day and trilogy at night  NSR on tele  BS pt- 160 in bladder. Pt eventually had incontinent episode in brief. Voiding without difficulty.  No complaints of bladder pain   Wheel chair bound       Problem: Lyubov Caceres Angina  - Evaluate fluid balance, assess for edema, trend weights  Outcome: Progressing  Goal: Absence of cardiac arrhythmias or at baseline  Description: INTERVENTIONS:  - Continuous cardiac monitoring, monitor vital signs, obtain 12 lead EKG if indicated

## 2021-12-23 NOTE — PLAN OF CARE
Assumed care of patient at 0730. Patient alert to self. Patient nonverbal, unable to assess orientation to place, time, or situation. Family stays at bedside. Oxygenation stable on 1L nasal cannula. Tele: normal sinus rhythm. Patient is continent of bowel. Monitor arterial and/or venous puncture sites for bleeding and/or hematoma  - Assess quality of pulses, skin color and temperature  - Assess for signs of decreased coronary artery perfusion - ex.  Angina  - Evaluate fluid balance, assess for edema, trend we

## 2022-12-28 NOTE — PROGRESS NOTES
10-    C-spine MRI    The spinal cord is normal    Moderate disc disease C5-C6, C6-C7         [facility]  Progress Note    Ginny Maradiaga Patient Status:  Inpatient    5/3/1990 MRN [id number]   [facility] 6NE-A Attending Abel Guerrero MD   Hosp Day # 1 PCP Devon Romero MD     Subjective:  Ginny Maradiaga is a(n) 30 yea Cardiomyopathy ([address]     TIA (obstructive sleep apnea)     Parainfluenza    ASSESSMENT  1. Viral syndrome with parainfluenza virus  2. TIA/CSA complex apnea - seeing Dr Amanda Fontaine - currently on BiPAP16/6 - rate was changed from 12 to 9 .  As per note , complianc oriented to person, place, time and situation

## 2023-01-11 NOTE — PROGRESS NOTES
Progress Note  Essence Hope Patient Status:  Inpatient    5/3/1990 MRN [id number]   [facility] 8NE-A Attending Garrett Davis MD   Hosp Day # 4 PCP Zara Sue MD     Subjective:  Resting comfortably in bed, no acute distress. succinate  12.5 mg Oral Nightly   • Insulin Aspart Pen  1-10 Units Subcutaneous TID CC and HS   • lidocaine (PF)  5 mL Intradermal Once   • apixaban  5 mg Oral BID   • baclofen  20 mg Oral Nightly   • pantoprazole  20 mg Oral QAM AC             Assessment: Qbrexza Counseling:  I discussed with the patient the risks of Qbrexza including but not limited to headache, mydriasis, blurred vision, dry eyes, nasal dryness, dry mouth, dry throat, dry skin, urinary hesitation, and constipation.  Local skin reactions including erythema, burning, stinging, and itching can also occur.

## 2023-06-19 NOTE — ED NOTES
Remains alert, resps unlabored.  Voiding per urinal.
Report to [facility]. Will call back to let us know when room is clean.
o2 2L per nc applied.
Detail Level: Detailed
Detail Level: Generalized

## 2024-06-27 NOTE — DISCHARGE SUMMARY
[facility] HOSPITALIST  DISCHARGE SUMMARY     David Lal Patient Status:  Inpatient    5/3/1990 MRN [id number]   [facility] 6NE-A Attending Prachi Irvin, DO   Hosp Day # 9 PCP Carlos Sanders MD     Date of Admission: 2019  Date o Patient maintaing sinus rhythm at time of discharge. Parainfluenza managed supportively. Patient followed by pulmonary for management of TIA/CSA. Patient discharged home in good condition.     Lace+ Score: 54  59-90 High Risk  29-58 Medium Risk  0-28   Low known as:  COZAAR      Take 25 mg by mouth daily. Refills:  10     MULTIVITAL-M Tabs      Take 1 tablet by mouth daily. Refills:  0     omeprazole 20 MG Cpdr  Commonly known as:  PRILOSEC      Take 20 mg by mouth daily as needed.    Refills:  0     spir F/w [facility], on wellbutrin, risperidone, seroquel  trend ECG, hold antipsychotics if QTc>500ms.

## (undated) NOTE — LETTER
[facility] [address], [address]  Consent for Procedure/Sedation    Date: 7/25    Time: 9:36a.m      1. I authorize the performance upon Jeni Kingston the following:  Transesophageal echo with possible cardioversion     2.  I au ________________________________    ___________________    Witness: _________________________      Date: ___________________    Printed: 2019   9:35 AM  Patient Name: Javed Coburn        : 5/3/1990       Medical Record #: [id number]

## (undated) NOTE — LETTER
Marnie Chun [address]  [place], [address] [facility]    Consent for Operation  Date: _____7/29/2019_____________                                Time: ___10:30____________    1.  I authorize the performance upon Cedric Laguna the following operati videotape. The hospitals will not be responsible for storage or maintenance of this tape. 6. For the purpose of advancing medical education, I consent to the admittance of observers to the Operating Room.   7. I authorize the use of any specimen, organs, ti When the patient is a minor or mentally incompetent to give consent:  Signature of person authorized to consent for patient: ___________________________   Relationship to patient: ____________________________________________________    Signature of Witness these medicines may increase my risk of anesthetic complications. iv. If I am allergic to anything or have had a reaction to anesthesia before. 3. I understand how the anesthesia medicine will help me (benefits).   4. I understand that with any type of an patient’s representative) and answered their questions. The patient or their representative has agreed to have anesthesia services.     _____________________________________________________________________________  Witness        Date   Time  I have sherif

## (undated) NOTE — LETTER
Marnie Chun [address] [address]  [place], [address] [facility]    Consent for Operation  Date: __________________                                Time: _______________    1.  I authorize the performance upon David Lal the following operation:  * No surg videotape. [facility] will not be responsible for storage or maintenance of this tape. 6. For the purpose of advancing medical education, I consent to the admittance of observers to the Operating Room.   7. I authorize the use of any specimen, organs, ti When the patient is a minor or mentally incompetent to give consent:  Signature of person authorized to consent for patient: ___________________________   Relationship to patient: ____________________________________________________    Signature of Witness these medicines may increase my risk of anesthetic complications. iv. If I am allergic to anything or have had a reaction to anesthesia before. 3. I understand how the anesthesia medicine will help me (benefits).   4. I understand that with any type of an patient’s representative) and answered their questions. The patient or their representative has agreed to have anesthesia services.     _____________________________________________________________________________  Witness        Date   Time  I have sherif